# Patient Record
Sex: MALE | Race: WHITE | NOT HISPANIC OR LATINO | Employment: OTHER | URBAN - METROPOLITAN AREA
[De-identification: names, ages, dates, MRNs, and addresses within clinical notes are randomized per-mention and may not be internally consistent; named-entity substitution may affect disease eponyms.]

---

## 2018-08-08 PROBLEM — E11.9 DIABETES MELLITUS: Status: ACTIVE | Noted: 2018-08-08

## 2018-08-08 PROBLEM — R93.2 ABNORMAL LIVER CT: Status: ACTIVE | Noted: 2018-08-08

## 2018-08-08 PROBLEM — K92.2 GASTROINTESTINAL HEMORRHAGE: Status: ACTIVE | Noted: 2018-08-08

## 2018-08-09 PROBLEM — D62 ACUTE BLOOD LOSS ANEMIA: Status: ACTIVE | Noted: 2018-08-09

## 2018-08-09 PROBLEM — I85.01 BLEEDING ESOPHAGEAL VARICES: Status: ACTIVE | Noted: 2018-08-09

## 2018-08-09 PROBLEM — K92.2 GASTROINTESTINAL HEMORRHAGE: Status: ACTIVE | Noted: 2018-08-09

## 2018-08-09 PROBLEM — K74.60 CIRRHOSIS OF LIVER: Status: ACTIVE | Noted: 2018-08-08

## 2018-08-10 PROBLEM — R16.0 LIVER MASS: Status: ACTIVE | Noted: 2018-08-10

## 2018-08-14 ENCOUNTER — TELEPHONE (OUTPATIENT)
Dept: TRANSPLANT | Facility: CLINIC | Age: 73
End: 2018-08-14

## 2018-08-14 NOTE — TELEPHONE ENCOUNTER
----- Message from Merced Farmer sent at 8/14/2018 11:58 AM CDT -----  We have the pt recorders and they are now pending review by the referral nurse.  By:Merced Farmer

## 2018-08-23 ENCOUNTER — TELEPHONE (OUTPATIENT)
Dept: TRANSPLANT | Facility: CLINIC | Age: 73
End: 2018-08-23

## 2018-08-23 NOTE — TELEPHONE ENCOUNTER
Pt records reviewed.  Pt will be referred to Hepatology due to cirrhosis and liver mass suspicious for HCC  Initial referral received  from Dr. Pollo Reyes  Referral letter sent to provider and patient.      Call placed to pt home, cell and wife's cell, lvm re need for appt and offered appt for tomorrow .  Requested a call back.      Reached pt on work phone, he accepted the appt for tomorrow at  320 with Dr Salinas. Directions given to the clinic.

## 2018-08-24 ENCOUNTER — TELEPHONE (OUTPATIENT)
Dept: HEPATOLOGY | Facility: CLINIC | Age: 73
End: 2018-08-24

## 2018-08-24 ENCOUNTER — OFFICE VISIT (OUTPATIENT)
Dept: HEPATOLOGY | Facility: CLINIC | Age: 73
End: 2018-08-24
Payer: MEDICARE

## 2018-08-24 VITALS
DIASTOLIC BLOOD PRESSURE: 67 MMHG | TEMPERATURE: 96 F | SYSTOLIC BLOOD PRESSURE: 141 MMHG | WEIGHT: 236.75 LBS | HEART RATE: 67 BPM | OXYGEN SATURATION: 97 % | RESPIRATION RATE: 18 BRPM | BODY MASS INDEX: 38.05 KG/M2 | HEIGHT: 66 IN

## 2018-08-24 DIAGNOSIS — C83.30 DIFFUSE LARGE CELL LYMPHOMA IN REMISSION: ICD-10-CM

## 2018-08-24 DIAGNOSIS — K74.60 HEPATIC CIRRHOSIS, UNSPECIFIED HEPATIC CIRRHOSIS TYPE, UNSPECIFIED WHETHER ASCITES PRESENT: Primary | ICD-10-CM

## 2018-08-24 DIAGNOSIS — Z85.47 HISTORY OF TESTICULAR CANCER: ICD-10-CM

## 2018-08-24 DIAGNOSIS — R16.0 LIVER MASS: ICD-10-CM

## 2018-08-24 PROBLEM — I85.01 BLEEDING ESOPHAGEAL VARICES: Status: RESOLVED | Noted: 2018-08-09 | Resolved: 2018-08-24

## 2018-08-24 PROCEDURE — 99999 PR PBB SHADOW E&M-EST. PATIENT-LVL III: CPT | Mod: PBBFAC,,, | Performed by: INTERNAL MEDICINE

## 2018-08-24 PROCEDURE — 99205 OFFICE O/P NEW HI 60 MIN: CPT | Mod: S$PBB,,, | Performed by: INTERNAL MEDICINE

## 2018-08-24 PROCEDURE — 99213 OFFICE O/P EST LOW 20 MIN: CPT | Mod: PBBFAC | Performed by: INTERNAL MEDICINE

## 2018-08-24 NOTE — PROGRESS NOTES
Subjective:       Patient ID: Andrei Longo is a 73 y.o. male.    Chief Complaint: Mass    HPI  I saw this 73 y.o. man who came to the liver clinic with his wife and daughter.    Referred by Dr Wilkerson    - variceal bleed 8/8/2018- banded    CT abdo: 8/8/2018 + MRI  Cirrhosis + 3.9cm caudate lobe mass  - suspicious for HCC    AFP normal    Lost 25 lb since bleed  Intentional weight loss    PMH:  Cirrhosis  Testicular cancer- 25 years ago- right side  Diffuse large cell lymphoma- 2007- shoulder/hip  - underwent chemo and some partial therapy at [facility]    DM- 3 years    SH:  Owns inspection company  Never drank a lot of alcohol  Never smoked      Review of Systems   Constitutional: Negative for activity change, appetite change, chills, fatigue, fever and unexpected weight change.   HENT: Negative for hearing loss.    Eyes: Negative for discharge and visual disturbance.   Respiratory: Negative for cough, chest tightness, shortness of breath and wheezing.    Cardiovascular: Negative for chest pain, palpitations and leg swelling.   Gastrointestinal: Negative for abdominal distention, abdominal pain, constipation, diarrhea and nausea.   Genitourinary: Negative for dysuria and frequency.   Musculoskeletal: Negative for arthralgias and back pain.   Skin: Negative for pallor and rash.   Neurological: Negative for dizziness, tremors, speech difficulty and headaches.   Hematological: Negative for adenopathy.   Psychiatric/Behavioral: Negative for agitation and confusion.           Lab Results   Component Value Date     (H) 08/13/2018    AST 53 08/13/2018    ALKPHOS 164 (H) 08/13/2018    BILITOT 1.6 (H) 08/13/2018     Past Medical History:   Diagnosis Date    Bleeding esophageal varices 8/9/2018    Diabetes mellitus     Testicular cancer      Past Surgical History:   Procedure Laterality Date    testicular surgery       Current Outpatient Medications   Medication Sig    carvedilol (COREG) 3.125 MG tablet  Take 1 tablet (3.125 mg total) by mouth 2 (two) times daily.    insulin detemir U-100 (LEVEMIR FLEXTOUCH U-100 INSULN) 100 unit/mL (3 mL) SubQ InPn pen Inject 30 Units into the skin every evening.    pantoprazole (PROTONIX) 40 MG tablet Take 1 tablet (40 mg total) by mouth once daily.     No current facility-administered medications for this visit.        Objective:      Physical Exam   Constitutional: He is oriented to person, place, and time. He appears well-nourished.   HENT:   Head: Normocephalic.   Eyes: Pupils are equal, round, and reactive to light.   Neck: No thyromegaly present.   Cardiovascular: Normal rate, regular rhythm and normal heart sounds.   Pulmonary/Chest: Effort normal and breath sounds normal. He has no wheezes.   Abdominal: Soft. He exhibits no distension and no mass. There is no tenderness.   Lymphadenopathy:     He has no cervical adenopathy.   Neurological: He is alert and oriented to person, place, and time.   Skin: Skin is warm. No rash noted. No erythema.   Psychiatric: He has a normal mood and affect. His behavior is normal.       Assessment:       1. Hepatic cirrhosis, unspecified hepatic cirrhosis type, unspecified whether ascites present    2. Liver mass    3. History of testicular cancer    4. Diffuse large cell lymphoma in remission        Plan:   Explained the diagnosis of cirrhosis and portal hypertension.  Discussed the possibility of HCC and potential treatments which include locoregional therapy    - discuss imaging at IR conference next week  - needs EGD and banding- will contact Dr Wilkerson  - discussed the fact that he is not a LT candidate    - given info on cirrhosis  - reminded not to have raw raw oysters

## 2018-08-24 NOTE — TELEPHONE ENCOUNTER
Patient: Andrei Longo       MRN: [id number]      : 1945     Age: 73 y.o.  [address] [place] [address]    Provider: Hepatologist Jensen Hurtado    Urgency of review: urgent    Patient Transplant Status: Not a candidate    Reason for presentation: Initial staging for transplant    Clinical Summary: 73 year old man with cirrhosis (?SOTO) who presented with a variceal bleed on 2018.  CT and MRI showed a 3.9 cm caudate lobe mass  AFP normal    Imaging to be reviewed: CT and MRI from 2018    HCC Treatment History: none    ABO: A POS    Platelets:   Lab Results   Component Value Date/Time     (L) 2018 05:55 AM     Creatinine:   Lab Results   Component Value Date/Time    CREATININE 0.60 (L) 2018 05:55 AM     Bilirubin:   Lab Results   Component Value Date/Time    BILITOT 1.6 (H) 2018 05:55 AM     AFP Last 3 each if available:   Lab Results   Component Value Date/Time    AFP 3.3 2018 03:58 AM       MELD: MELD-Na score: 10 at 2018  9:42 PM  MELD score: 10 at 2018  9:42 PM  Calculated from:  Serum Creatinine: 0.6 mg/dL (Rounded to 1 mg/dL) at 2018  9:42 PM  Serum Sodium: 136 mmol/L at 2018  9:42 PM  Total Bilirubin: 1.5 mg/dL at 2018  9:42 PM  INR(ratio): 1.2 at 2018  2:18 AM  Age: 73 years    Plan: MRI 8/10/18 and CT 18 show a 3.9cm caudate lobe mass.  Appears to demonstrate hypoenhancement on all sequences without early arterial enhancement.  Given normal AFP and patients history of testicular lymphoma with contralateral relapse and axillary metastasis, would be more suspicious for a metastatic focus.    CTg biopsy to determine    Follow-up Provider: Adebayo Hurtado MD

## 2018-08-24 NOTE — LETTER
August 24, 2018      Pollo Wilkersno MD  [address]  [address]  [place] [address]           [facility] - Hepatology  [address]  [place] [address]  Phone: [phone]  Fax: [phone]          Patient: Andrei Longo   MR Number: [id number]   YOB: 1945   Date of Visit: 8/24/2018       Dear Dr. Pollo Wilkerson:    Thank you for referring Andrei Longo to me for evaluation. Attached you will find relevant portions of my assessment and plan of care.    If you have questions, please do not hesitate to call me. I look forward to following Andrei Longo along with you.    Sincerely,    Adebayo Hurtado MD    Enclosure  CC:  No Recipients    If you would like to receive this communication electronically, please contact [email or web address] or [phone] to request more information on [employer] access.    For providers and/or their staff who would like to refer a patient to [facility], please contact us through our one-stop-shop provider referral line, [facility], at [phone].    If you feel you have received this communication in error or would no longer like to receive these types of communications, please e-mail [email or web address]

## 2018-08-24 NOTE — TELEPHONE ENCOUNTER
Patient: Andrei Longo       MRN: [id number]      : 1945     Age: 73 y.o.  [address] [place] [address]    Provider: Hepatologist - [facility]    Urgency of review: urgent    Patient Transplant Status: Not a candidate    Reason for presentation: Initial staging for transplant    Clinical Summary: 73 year old man with cirrhosis (?SOTO) who presented with a variceal bleed on 2018.  CT and MRI showed a 3.9 cm caudate lobe mass  AFP normal    Imaging to be reviewed: CT and MRI from 2018    HCC Treatment History: none    ABO: A POS    Platelets:   Lab Results   Component Value Date/Time     (L) 2018 05:55 AM     Creatinine:   Lab Results   Component Value Date/Time    CREATININE 0.60 (L) 2018 05:55 AM     Bilirubin:   Lab Results   Component Value Date/Time    BILITOT 1.6 (H) 2018 05:55 AM     AFP Last 3 each if available:   Lab Results   Component Value Date/Time    AFP 3.3 2018 03:58 AM       MELD: MELD-Na score: 10 at 2018  9:42 PM  MELD score: 10 at 2018  9:42 PM  Calculated from:  Serum Creatinine: 0.6 mg/dL (Rounded to 1 mg/dL) at 2018  9:42 PM  Serum Sodium: 136 mmol/L at 2018  9:42 PM  Total Bilirubin: 1.5 mg/dL at 2018  9:42 PM  INR(ratio): 1.2 at 2018  2:18 AM  Age: 73 years    Plan:     Follow-up Provider:

## 2018-08-28 ENCOUNTER — CONFERENCE (OUTPATIENT)
Dept: TRANSPLANT | Facility: CLINIC | Age: 73
End: 2018-08-28

## 2018-08-29 ENCOUNTER — PATIENT MESSAGE (OUTPATIENT)
Dept: HEPATOLOGY | Facility: CLINIC | Age: 73
End: 2018-08-29

## 2018-08-29 DIAGNOSIS — R16.0 LIVER MASS: Primary | ICD-10-CM

## 2018-09-04 DIAGNOSIS — R16.0 LIVER MASS: Primary | ICD-10-CM

## 2018-09-06 PROBLEM — I85.00 ESOPHAGEAL VARICES: Status: ACTIVE | Noted: 2018-09-06

## 2018-09-12 ENCOUNTER — HOSPITAL ENCOUNTER (OUTPATIENT)
Facility: HOSPITAL | Age: 73
Discharge: HOME | End: 2018-09-12
Attending: INTERNAL MEDICINE | Admitting: INTERNAL MEDICINE
Payer: MEDICARE

## 2018-09-12 VITALS
WEIGHT: 233 LBS | HEART RATE: 57 BPM | DIASTOLIC BLOOD PRESSURE: 46 MMHG | TEMPERATURE: 98 F | OXYGEN SATURATION: 100 % | RESPIRATION RATE: 16 BRPM | SYSTOLIC BLOOD PRESSURE: 101 MMHG | BODY MASS INDEX: 36.57 KG/M2 | HEIGHT: 67 IN

## 2018-09-12 DIAGNOSIS — R16.0 LIVER MASS: ICD-10-CM

## 2018-09-12 LAB
INR PPP: 1
POCT GLUCOSE: 169 MG/DL (ref 70–110)
PROTHROMBIN TIME: 10.3 SEC

## 2018-09-12 PROCEDURE — 63600175 PHARM REV CODE 636 W HCPCS: Performed by: FAMILY MEDICINE

## 2018-09-12 PROCEDURE — 88342 IMHCHEM/IMCYTCHM 1ST ANTB: CPT | Mod: 26,,, | Performed by: PATHOLOGY

## 2018-09-12 PROCEDURE — 63600175 PHARM REV CODE 636 W HCPCS: Performed by: RADIOLOGY

## 2018-09-12 PROCEDURE — 88307 TISSUE EXAM BY PATHOLOGIST: CPT | Mod: 26,,, | Performed by: PATHOLOGY

## 2018-09-12 PROCEDURE — 25000003 PHARM REV CODE 250: Performed by: FAMILY MEDICINE

## 2018-09-12 PROCEDURE — 88333 PATH CONSLTJ SURG CYTO XM 1: CPT | Mod: 26,,, | Performed by: PATHOLOGY

## 2018-09-12 PROCEDURE — 88341 IMHCHEM/IMCYTCHM EA ADD ANTB: CPT | Mod: 59 | Performed by: PATHOLOGY

## 2018-09-12 PROCEDURE — 85610 PROTHROMBIN TIME: CPT

## 2018-09-12 PROCEDURE — 82962 GLUCOSE BLOOD TEST: CPT

## 2018-09-12 RX ORDER — SODIUM CHLORIDE 9 MG/ML
500 INJECTION, SOLUTION INTRAVENOUS ONCE
Status: COMPLETED | OUTPATIENT
Start: 2018-09-12 | End: 2018-09-12

## 2018-09-12 RX ORDER — MIDAZOLAM HYDROCHLORIDE 1 MG/ML
INJECTION INTRAMUSCULAR; INTRAVENOUS CODE/TRAUMA/SEDATION MEDICATION
Status: COMPLETED | OUTPATIENT
Start: 2018-09-12 | End: 2018-09-12

## 2018-09-12 RX ORDER — FENTANYL CITRATE 50 UG/ML
50 INJECTION, SOLUTION INTRAMUSCULAR; INTRAVENOUS
Status: DISCONTINUED | OUTPATIENT
Start: 2018-09-12 | End: 2018-09-12 | Stop reason: HOSPADM

## 2018-09-12 RX ORDER — MIDAZOLAM HYDROCHLORIDE 1 MG/ML
1 INJECTION INTRAMUSCULAR; INTRAVENOUS
Status: DISCONTINUED | OUTPATIENT
Start: 2018-09-12 | End: 2018-09-12 | Stop reason: HOSPADM

## 2018-09-12 RX ORDER — FENTANYL CITRATE 50 UG/ML
INJECTION, SOLUTION INTRAMUSCULAR; INTRAVENOUS CODE/TRAUMA/SEDATION MEDICATION
Status: COMPLETED | OUTPATIENT
Start: 2018-09-12 | End: 2018-09-12

## 2018-09-12 RX ADMIN — FENTANYL CITRATE 50 MCG: 50 INJECTION, SOLUTION INTRAMUSCULAR; INTRAVENOUS at 08:09

## 2018-09-12 RX ADMIN — MIDAZOLAM HYDROCHLORIDE 1 MG: 1 INJECTION, SOLUTION INTRAMUSCULAR; INTRAVENOUS at 08:09

## 2018-09-12 RX ADMIN — SODIUM CHLORIDE 500 ML: 0.9 INJECTION, SOLUTION INTRAVENOUS at 06:09

## 2018-09-12 RX ADMIN — MIDAZOLAM HYDROCHLORIDE 0.5 MG: 1 INJECTION, SOLUTION INTRAMUSCULAR; INTRAVENOUS at 08:09

## 2018-09-12 RX ADMIN — FENTANYL CITRATE 25 MCG: 50 INJECTION, SOLUTION INTRAMUSCULAR; INTRAVENOUS at 08:09

## 2018-09-12 NOTE — PROGRESS NOTES
Ok to discharge per Dr Root. Discharge instructions reviewed with patient and family. Understanding verbalized. Dressing CDI. VSS. IV discontinued with cannula intact, dressing applied.  Patient to garage via wheelchair by transport with family.

## 2018-09-12 NOTE — H&P
Radiology History & Physical      SUBJECTIVE:     Chief Complaint: Hepatic mass    History of Present Illness:  Andrei Longo is a 73 y.o. male who presents for image-guided biopsy of hepatic mass.    Past Medical History:   Diagnosis Date    Anemia     Bleeding esophageal varices 8/9/2018    Diabetes mellitus     Encounter for blood transfusion     Portal hypertensive gastropathy     Testicular cancer      Past Surgical History:   Procedure Laterality Date    EGD (ESOPHAGOGASTRODUODENOSCOPY) N/A 8/8/2018    Per[facility]O    ESOPHAGOGASTRODUODENOSCOPY N/A 8/8/2018    Procedure: EGD (ESOPHAGOGASTRODUODENOSCOPY);  Surgeon: Pollo Wilkerson MD;  Location: [facility];  Service: Endoscopy;  Laterality: N/A;    ESOPHAGOGASTRODUODENOSCOPY N/A 9/6/2018    Procedure: ESOPHAGOGASTRODUODENOSCOPY (EGD);  Surgeon: Pollo Wilkerson MD;  Location: [facility];  Service: Endoscopy;  Laterality: N/A;    ESOPHAGOGASTRODUODENOSCOPY (EGD) N/A 9/6/2018    Performed by [facility]erson MD at [facility]    TESTICLE SURGERY      testicular surgery         Home Meds:   Prior to Admission medications    Medication Sig Start Date End Date Taking? Authorizing Provider   carvedilol (COREG) 3.125 MG tablet Take 1 tablet (3.125 mg total) by mouth 2 (two) times daily. 8/13/18 8/13/19 Yes Carole Mcintosh NP   insulin detemir U-100 (LEVEMIR FLEXTOUCH U-100 INSULN) 100 unit/mL (3 mL) SubQ InPn pen Inject 30 Units into the skin every evening. 8/13/18 8/13/19 Yes Carole Mcintosh NP   pantoprazole (PROTONIX) 40 MG tablet Take 1 tablet (40 mg total) by mouth once daily. 8/13/18 8/13/19 Yes Carole Mcintosh NP     Anticoagulants/Antiplatelets: no anticoagulation    Allergies: Review of patient's allergies indicates:  No Known Allergies  Sedation History:  no adverse reactions    Review of Systems:   Hematological: no known coagulopathies  Respiratory: no shortness of breath  Cardiovascular:  no chest pain  Gastrointestinal: no abdominal pain  Genito-Urinary: no dysuria  Musculoskeletal: negative  Neurological: no TIA or stroke symptoms         OBJECTIVE:     Vital Signs (Most Recent)  Temp: 97.8 °F (36.6 °C) (09/12/18 0647)  Pulse: 65 (09/12/18 0647)  Resp: 18 (09/12/18 0647)  BP: (!) 111/53 (09/12/18 0647)  SpO2: 99 % (09/12/18 0647)    Physical Exam:  ASA: 2  Mallampati: 2    General: no acute distress  Mental Status: alert and oriented to person, place and time  HEENT: normocephalic, atraumatic  Chest: unlabored breathing  Abdomen: nondistended  Extremity: moves all extremities    Laboratory  Lab Results   Component Value Date    INR 1.2 (H) 08/08/2018       Lab Results   Component Value Date    WBC 5.80 08/13/2018    HGB 8.6 (L) 08/13/2018    HCT 25.4 (L) 08/13/2018    MCV 91 08/13/2018     (L) 08/13/2018      Lab Results   Component Value Date     (H) 08/13/2018     08/13/2018    K 3.8 08/13/2018     08/13/2018    CO2 26 08/13/2018    BUN 10 08/13/2018    CREATININE 0.60 (L) 08/13/2018    CALCIUM 8.1 (L) 08/13/2018     (H) 08/13/2018    AST 53 08/13/2018    ALBUMIN 3.0 (L) 08/13/2018    BILITOT 1.6 (H) 08/13/2018       ASSESSMENT/PLAN:     Sedation Plan: Up to moderate  Patient will undergo image-guided percutaneous biopsy of hepatic mass.    Eric Prescott M.D.  PGY-3 Radiology

## 2018-09-12 NOTE — PROGRESS NOTES
Pt arrived to ROCU bed 1 for 2 hour post CT liver biopsy recovery. Report received from SARTHAK Sparks. Pt denies pain/discomfort. Dressing CDI. VSS. No acute events. Family to bedside. See flow sheets for post procedure monitoring.

## 2018-09-14 ENCOUNTER — TELEPHONE (OUTPATIENT)
Dept: TRANSPLANT | Facility: CLINIC | Age: 73
End: 2018-09-14

## 2018-09-14 NOTE — TELEPHONE ENCOUNTER
IR Liver Pathology Conference Note    Patient:  Andrei Longo  MRN:   [id number]  YOB: 1945  Date of Transplant:  N/A  Native Diagnosis:     Discussion/Plan:    Presenter: Hepatologist - [place]    Reason for presenting: diagnosis confirmation    Concerns for Pathologists:   73 year old man with cirrhosis (?SOTO) who presented with a variceal bleed on 8/8/2018.  CT and MRI showed a 3.9 cm caudate lobe mass  AFP normal    MRI 8/10/18 and CT 8/8/18 show a 3.9cm caudate lobe mass.  Appears to demonstrate hypoenhancement on all sequences without early arterial enhancement.  Given normal AFP and patients history of testicular Cancer 25 years ago + diffuse large cell lymphoma in 2007 - would be more suspicious for a metastatic focus    Lab Results  WBC (K/uL)   Date Value   08/13/2018 5.80   08/12/2018 6.90   08/11/2018 11.40     PLT (K/uL)   Date Value   08/13/2018 105 (L)   08/12/2018 99 (L)   08/11/2018 112 (L)     INR (no units)   Date Value   09/12/2018 1.0   08/08/2018 1.2 (H)     AST (U/L)   Date Value   08/13/2018 53     ALT (U/L)   Date Value   08/13/2018 115 (H)   08/12/2018 145 (H)   08/11/2018 214 (H)     BILITOT (mg/dL)   Date Value   08/13/2018 1.6 (H)   08/12/2018 1.2   08/11/2018 1.6 (H)     ALKPHOS (U/L)   Date Value   08/13/2018 164 (H)   08/12/2018 137   08/11/2018 120     CREATININE (mg/dL)   Date Value   08/13/2018 0.60 (L)   08/12/2018 0.60 (L)   08/11/2018 0.60 (L)     AFP (ng/mL)   Date Value   08/09/2018 3.3

## 2018-09-25 ENCOUNTER — PATIENT MESSAGE (OUTPATIENT)
Dept: HEPATOLOGY | Facility: CLINIC | Age: 73
End: 2018-09-25

## 2018-09-27 ENCOUNTER — PATIENT MESSAGE (OUTPATIENT)
Dept: HEPATOLOGY | Facility: CLINIC | Age: 73
End: 2018-09-27

## 2018-09-28 ENCOUNTER — TELEPHONE (OUTPATIENT)
Dept: HEPATOLOGY | Facility: CLINIC | Age: 73
End: 2018-09-28

## 2018-09-28 DIAGNOSIS — K74.60 CIRRHOSIS OF LIVER WITHOUT ASCITES, UNSPECIFIED HEPATIC CIRRHOSIS TYPE: Primary | ICD-10-CM

## 2018-09-28 NOTE — TELEPHONE ENCOUNTER
Explained that his imaging was reviewed and did not look like a definitive HCC. His targeted liver biopsy showed cirrhosis/necrosis but no definite malignancy.    - agreed to repeat scan in 3 months  - same day MRI and visit with me

## 2018-10-01 NOTE — TELEPHONE ENCOUNTER
Pt contacted by phone and MRI, labs and clinic visit scheduled. Pt verbalized understanding and agreement.   Appt slips printed and mailed to pt.     SCC

## 2018-11-16 ENCOUNTER — HOSPITAL ENCOUNTER (OUTPATIENT)
Dept: RADIOLOGY | Facility: HOSPITAL | Age: 73
Discharge: HOME | End: 2018-11-16
Attending: INTERNAL MEDICINE
Payer: MEDICARE

## 2018-11-16 ENCOUNTER — OFFICE VISIT (OUTPATIENT)
Dept: HEPATOLOGY | Facility: CLINIC | Age: 73
End: 2018-11-16
Payer: MEDICARE

## 2018-11-16 VITALS
SYSTOLIC BLOOD PRESSURE: 128 MMHG | BODY MASS INDEX: 37.56 KG/M2 | OXYGEN SATURATION: 98 % | TEMPERATURE: 98 F | WEIGHT: 233.69 LBS | HEIGHT: 66 IN | HEART RATE: 63 BPM | DIASTOLIC BLOOD PRESSURE: 60 MMHG | RESPIRATION RATE: 18 BRPM

## 2018-11-16 DIAGNOSIS — K74.60 LIVER CIRRHOSIS SECONDARY TO NASH (NONALCOHOLIC STEATOHEPATITIS): Primary | ICD-10-CM

## 2018-11-16 DIAGNOSIS — K74.60 CIRRHOSIS OF LIVER WITHOUT ASCITES, UNSPECIFIED HEPATIC CIRRHOSIS TYPE: ICD-10-CM

## 2018-11-16 DIAGNOSIS — K74.60 HEPATIC CIRRHOSIS, UNSPECIFIED HEPATIC CIRRHOSIS TYPE, UNSPECIFIED WHETHER ASCITES PRESENT: ICD-10-CM

## 2018-11-16 DIAGNOSIS — K92.2 GASTROINTESTINAL HEMORRHAGE, UNSPECIFIED GASTROINTESTINAL HEMORRHAGE TYPE: ICD-10-CM

## 2018-11-16 DIAGNOSIS — R16.0 LIVER MASS: ICD-10-CM

## 2018-11-16 DIAGNOSIS — K75.81 LIVER CIRRHOSIS SECONDARY TO NASH (NONALCOHOLIC STEATOHEPATITIS): Primary | ICD-10-CM

## 2018-11-16 PROCEDURE — A9585 GADOBUTROL INJECTION: HCPCS | Performed by: INTERNAL MEDICINE

## 2018-11-16 PROCEDURE — 74183 MRI ABD W/O CNTR FLWD CNTR: CPT | Mod: TC

## 2018-11-16 PROCEDURE — 74183 MRI ABD W/O CNTR FLWD CNTR: CPT | Mod: 26,,, | Performed by: RADIOLOGY

## 2018-11-16 PROCEDURE — 99999 PR PBB SHADOW E&M-EST. PATIENT-LVL IV: CPT | Mod: PBBFAC,,, | Performed by: INTERNAL MEDICINE

## 2018-11-16 PROCEDURE — 99214 OFFICE O/P EST MOD 30 MIN: CPT | Mod: PBBFAC,25 | Performed by: INTERNAL MEDICINE

## 2018-11-16 PROCEDURE — 25500020 PHARM REV CODE 255: Performed by: INTERNAL MEDICINE

## 2018-11-16 PROCEDURE — 99214 OFFICE O/P EST MOD 30 MIN: CPT | Mod: S$PBB,,, | Performed by: INTERNAL MEDICINE

## 2018-11-16 RX ORDER — GADOBUTROL 604.72 MG/ML
10 INJECTION INTRAVENOUS
Status: COMPLETED | OUTPATIENT
Start: 2018-11-16 | End: 2018-11-16

## 2018-11-16 RX ADMIN — GADOBUTROL 10 ML: 604.72 INJECTION INTRAVENOUS at 02:11

## 2018-11-16 NOTE — PROGRESS NOTES
Subjective:       Patient ID: Andrei Longo is a 73 y.o. male.    Chief Complaint: Cirrhosis    HPI  I saw this 73 y.o. man who came to the liver clinic with his wife and daughter.    Referred by Dr Wilkerson    - variceal bleed 8/8/2018- banded  - at the time of his initial referral his imaging suggested an HCC.    CT abdo: 8/8/2018 + MRI  Cirrhosis + 3.9cm caudate lobe mass  - suspicious for HCC    AFP normal    Lost 25 lb since bleed  Intentional weight loss    Liver biopsy: 9/12/2018  The biopsy shows multiple fragments of cirrhotic liver. Also, are present few necrotic foci. There is no definitive  neoplasm/malignancy in this specimen; however, it cannot be completely excluded. In view of clinical presentation  of a mass, representative issues are a consideration. Please correlate clinically with repeat sampling as indicated.  Few immunostains (CAM52, synaptophysin, CD3 AND CD20) have been performed with appropriate controls;  however, no viable cells are identified in the necrotic focus.    MRI abdo: 11/16/2018  Persistent caudate lobe hypertrophy in the setting of cirrhosis with relative decreased prominence from prior.No suspicious solid enhancing lesion or washout.  Continued surveillance recommended.  Incidental intra-pancreatic tail splenules.  Enlarged spleen with suspected small inferior splenic infarct.  Trace perihepatic ascites.    PMH:  Cirrhosis  Testicular cancer- 25 years ago- right side  Diffuse large cell lymphoma- 2007- shoulder/hip  - underwent chemo and some partial therapy at [facility]    DM- 3 years    SH:  Owns inspection company  Never drank a lot of alcohol  Never smoked    Review of Systems   Constitutional: Negative for activity change, appetite change, chills, fatigue, fever and unexpected weight change.   HENT: Negative for hearing loss.    Eyes: Negative for discharge and visual disturbance.   Respiratory: Negative for cough, chest tightness, shortness of breath and wheezing.     Cardiovascular: Negative for chest pain, palpitations and leg swelling.   Gastrointestinal: Negative for abdominal distention, abdominal pain, constipation, diarrhea and nausea.   Genitourinary: Negative for dysuria and frequency.   Musculoskeletal: Negative for arthralgias and back pain.   Skin: Negative for pallor and rash.   Neurological: Negative for dizziness, tremors, speech difficulty and headaches.   Hematological: Negative for adenopathy.   Psychiatric/Behavioral: Negative for agitation and confusion.           Lab Results   Component Value Date    ALT 46 (H) 11/16/2018    AST 52 (H) 11/16/2018    ALKPHOS 136 (H) 11/16/2018    BILITOT 1.2 (H) 11/16/2018     Past Medical History:   Diagnosis Date    Anemia     Bleeding esophageal varices 8/9/2018    Diabetes mellitus     Encounter for blood transfusion     Portal hypertensive gastropathy     Testicular cancer      Past Surgical History:   Procedure Laterality Date    [id number] N/A 9/12/2018    Performed by [facility] at [facility] OR [address] [place] [address]    EGD (ESOPHAGOGASTRODUODENOSCOPY) N/A 8/8/2018    Per[facility] by [facility] at [facility]    ESOPHAGOGASTRODUODENOSCOPY N/A 8/8/2018    Procedure: EGD (ESOPHAGOGASTRODUODENOSCOPY);  Surgeon: Pollo Wilkerson MD;  Location: [facility];  Service: Endoscopy;  Laterality: N/A;    ESOPHAGOGASTRODUODENOSCOPY N/A 9/6/2018    Procedure: ESOPHAGOGASTRODUODENOSCOPY (EGD);  Surgeon: Pollo Wilkerson MD;  Location: [facility];  Service: Endoscopy;  Laterality: N/A;    ESOPHAGOGASTRODUODENOSCOPY (EGD) N/A 9/6/2018    Performed by [facility]erson MD at [facility]    TESTICLE SURGERY      testicular surgery       Current Outpatient Medications   Medication Sig    carvedilol (COREG) 3.125 MG tablet Take 1 tablet (3.125 mg total) by mouth 2 (two) times daily.    insulin detemir U-100 (LEVEMIR FLEXTOUCH U-100 INSULN) 100 unit/mL (3 mL) SubQ InPn pen Inject 30 Units into the skin every  evening. (Patient taking differently: Inject 5 Units into the skin every evening. )     No current facility-administered medications for this visit.        Objective:      Physical Exam   Constitutional: He is oriented to person, place, and time. He appears well-nourished.   HENT:   Head: Normocephalic.   Eyes: Pupils are equal, round, and reactive to light.   Neck: No thyromegaly present.   Cardiovascular: Normal rate, regular rhythm and normal heart sounds.   Pulmonary/Chest: Effort normal and breath sounds normal. He has no wheezes.   Abdominal: Soft. He exhibits no distension and no mass. There is no tenderness.   Lymphadenopathy:     He has no cervical adenopathy.   Neurological: He is alert and oriented to person, place, and time.   Skin: Skin is warm. No rash noted. No erythema.   Psychiatric: He has a normal mood and affect. His behavior is normal.       Assessment:       1. Liver cirrhosis secondary to SOTO (nonalcoholic steatohepatitis)    2. Hepatic cirrhosis, unspecified hepatic cirrhosis type, unspecified whether ascites present    3. Gastrointestinal hemorrhage, unspecified gastrointestinal hemorrhage type    4. Liver mass        Plan:   Explained the diagnosis of cirrhosis and portal hypertension.  Discussed the possibility of HCC and the fact that his recent biopsy and today's MRI, do not show that.    - will discuss new imaging at IR conference next week  - needs EGD and banding- will contact Dr Wilkerson  - stable cirrhosis    - 3 months

## 2018-11-19 ENCOUNTER — TELEPHONE (OUTPATIENT)
Dept: TRANSPLANT | Facility: CLINIC | Age: 73
End: 2018-11-19

## 2018-11-19 NOTE — TELEPHONE ENCOUNTER
Patient: Andrei Longo       MRN: [id number]      : 1945     Age: 73 y.o.  [address] [place] [address]    Provider: Hepatologist - [facility]    Urgency of review: non-urgent    Patient Transplant Status: Not a candidate    Reason for presentation: Indeterminate lesion    Clinical Summary: SOTO cirrhosis with ?3.5cm HCC on outside scan not confirmed on our scans.  Targeted liver biopsy showed cirrhosis  Hx of testicular Ca + lymphoma    Imaging to be reviewed: MRI 2018    HCC Treatment History: none    ABO: A POS    Platelets:   Lab Results   Component Value Date/Time     (L) 2018 11:51 AM     Creatinine:   Lab Results   Component Value Date/Time    CREATININE 0.7 2018 11:51 AM     Bilirubin:   Lab Results   Component Value Date/Time    BILITOT 1.2 (H) 2018 11:51 AM     AFP Last 3 each if available:   Lab Results   Component Value Date/Time    AFP 3.3 2018 11:51 AM    AFP 3.3 2018 03:58 AM       MELD: MELD-Na score: 7 at 2018 11:51 AM  MELD score: 7 at 2018 11:51 AM  Calculated from:  Serum Creatinine: 0.7 mg/dL (Rounded to 1 mg/dL) at 2018 11:51 AM  Serum Sodium: 138 mmol/L (Rounded to 137 mmol/L) at 2018 11:51 AM  Total Bilirubin: 1.2 mg/dL at 2018 11:51 AM  INR(ratio): 0.9 (Rounded to 1) at 2018 11:51 AM  Age: 73 years    Plan:     Follow-up Provider:

## 2018-11-21 NOTE — TELEPHONE ENCOUNTER
Patient: Andrei Longo       MRN: [id number]      : 1945     Age: 73 y.o.  [address] [place] [address]    Provider: Hepatologist Jensen Hurtado    Urgency of review: non-urgent    Patient Transplant Status: Not a candidate    Reason for presentation: Indeterminate lesion    Clinical Summary: SOTO cirrhosis with ?3.5cm HCC on outside scan not confirmed on our scans.  Targeted liver biopsy showed cirrhosis  Hx of testicular Ca + lymphoma    Imaging to be reviewed: MRI 2018    HCC Treatment History: none    ABO: A POS    Platelets:   Lab Results   Component Value Date/Time     (L) 2018 11:51 AM     Creatinine:   Lab Results   Component Value Date/Time    CREATININE 0.7 2018 11:51 AM     Bilirubin:   Lab Results   Component Value Date/Time    BILITOT 1.2 (H) 2018 11:51 AM     AFP Last 3 each if available:   Lab Results   Component Value Date/Time    AFP 3.3 2018 11:51 AM    AFP 3.3 2018 03:58 AM       MELD: MELD-Na score: 7 at 2018 11:51 AM  MELD score: 7 at 2018 11:51 AM  Calculated from:  Serum Creatinine: 0.7 mg/dL (Rounded to 1 mg/dL) at 2018 11:51 AM  Serum Sodium: 138 mmol/L (Rounded to 137 mmol/L) at 2018 11:51 AM  Total Bilirubin: 1.2 mg/dL at 2018 11:51 AM  INR(ratio): 0.9 (Rounded to 1) at 2018 11:51 AM  Age: 73 years    Plan:Decrease size of caudate lobe lesion when compared to multiple priors. Initial imaging showed heterogeneous lesion without features of HCC. Given improvement could this have represented an abscess. Biopsy was right on target. Rec follow up with repeat MRI in 3 months.    Large caudate lobe lesion with  heterogeneous enhancement pattern demonstrates interval decrease in size. Favor benign etiology, as it seems to be resolving on its own. Recommend repeating MRI in 3 months.         Follow-up Provider: Adebayo Hurtado MD

## 2018-11-27 ENCOUNTER — TELEPHONE (OUTPATIENT)
Dept: HEPATOLOGY | Facility: CLINIC | Age: 73
End: 2018-11-27

## 2018-11-27 ENCOUNTER — CONFERENCE (OUTPATIENT)
Dept: TRANSPLANT | Facility: CLINIC | Age: 73
End: 2018-11-27

## 2018-11-27 NOTE — TELEPHONE ENCOUNTER
Pt contacted by phone and IR conference recommendations reviewed. Labs and MRI will be scheduled in 3 months. Pt verbalized understanding and agreement.    SCC

## 2019-05-14 ENCOUNTER — TELEPHONE (OUTPATIENT)
Dept: HEPATOLOGY | Facility: CLINIC | Age: 74
End: 2019-05-14

## 2019-05-14 NOTE — TELEPHONE ENCOUNTER
Attempted to contact pt to schedule f/u labs, MRI and clinic visit with Dr. Hurtado. VM left. Awaiting call back.     SCC

## 2019-05-16 ENCOUNTER — TELEPHONE (OUTPATIENT)
Dept: HEPATOLOGY | Facility: CLINIC | Age: 74
End: 2019-05-16

## 2019-05-16 NOTE — TELEPHONE ENCOUNTER
----- Message from Erika Stinson sent at 5/16/2019  3:27 PM CDT -----  Contact: pt wife  Please call pt wife at [phone]    Patient is calling for future appt    Thank you

## 2019-06-12 ENCOUNTER — TELEPHONE (OUTPATIENT)
Dept: HEPATOLOGY | Facility: CLINIC | Age: 74
End: 2019-06-12

## 2019-06-12 NOTE — TELEPHONE ENCOUNTER
Patient of Dr. Hurtado'. Due for repeat MRI (per IR conference to watch past liver lesion) . Pt is coming for f/u appt already in July    Can you please contact pt, arrange for repeat MRI when he is here for f/u visit? MRI order already in per Dr. Hurtado

## 2019-06-12 NOTE — TELEPHONE ENCOUNTER
----- Message from Adebayo Hurtado MD sent at 6/12/2019  9:44 AM CDT -----  Regarding: RE: Chart review: due for MRI per IR recs 11/2018, want to do same day as f/u?  Yes please    ----- Message -----  From: Raissa Mtz NP  Sent: 6/11/2019   2:46 PM  To: Adebayo Hurtado MD  Subject: Chart review: due for MRI per IR recs 11/201#    Chart review:   IR recs 11/2018 recommend repeat MRI 2/2019, overdue  He is coming for f/u appt with you in July    Do you want to repeat MRI while he is here? Order already in

## 2019-07-05 ENCOUNTER — TELEPHONE (OUTPATIENT)
Dept: HEPATOLOGY | Facility: CLINIC | Age: 74
End: 2019-07-05

## 2019-07-05 NOTE — TELEPHONE ENCOUNTER
MA called patient back reschedule his MRI and follow up visit. Mailed new appt reminder to patient.

## 2019-07-05 NOTE — TELEPHONE ENCOUNTER
----- Message from Marcy Romero sent at 7/5/2019  8:37 AM CDT -----  Contact: patient   Needs Advice    Reason for call: Pt wishes to speak with nurse concerning rescheduling appointment         Communication Preference: [phone]    Additional Information: n/a

## 2019-07-12 ENCOUNTER — HOSPITAL ENCOUNTER (OUTPATIENT)
Dept: RADIOLOGY | Facility: HOSPITAL | Age: 74
Discharge: HOME | End: 2019-07-12
Attending: INTERNAL MEDICINE
Payer: MEDICARE

## 2019-07-12 PROCEDURE — 25500020 PHARM REV CODE 255: Performed by: INTERNAL MEDICINE

## 2019-07-12 PROCEDURE — A9585 GADOBUTROL INJECTION: HCPCS | Performed by: INTERNAL MEDICINE

## 2019-07-12 PROCEDURE — 74183 MRI ABDOMEN W WO CONTRAST: ICD-10-PCS | Mod: 26,,, | Performed by: RADIOLOGY

## 2019-07-12 PROCEDURE — 74183 MRI ABD W/O CNTR FLWD CNTR: CPT | Mod: 26,,, | Performed by: RADIOLOGY

## 2019-07-12 PROCEDURE — 74183 MRI ABD W/O CNTR FLWD CNTR: CPT | Mod: TC

## 2019-07-12 RX ORDER — GADOBUTROL 604.72 MG/ML
10 INJECTION INTRAVENOUS
Status: COMPLETED | OUTPATIENT
Start: 2019-07-12 | End: 2019-07-12

## 2019-07-12 RX ADMIN — GADOBUTROL 10 ML: 604.72 INJECTION INTRAVENOUS at 07:07

## 2019-07-15 ENCOUNTER — TELEPHONE (OUTPATIENT)
Dept: HEPATOLOGY | Facility: CLINIC | Age: 74
End: 2019-07-15

## 2019-07-15 NOTE — TELEPHONE ENCOUNTER
----- Message from Adebayo Hurtado MD sent at 7/12/2019  1:20 PM CDT -----  Please let him know that his MRI is stable- no concerns

## 2019-07-16 LAB
CREAT SERPL-MCNC: 0.5 MG/DL (ref 0.5–1.4)
SAMPLE: NORMAL

## 2019-08-02 ENCOUNTER — TELEPHONE (OUTPATIENT)
Dept: HEPATOLOGY | Facility: CLINIC | Age: 74
End: 2019-08-02

## 2019-08-02 ENCOUNTER — LAB VISIT (OUTPATIENT)
Dept: LAB | Facility: HOSPITAL | Age: 74
End: 2019-08-02
Attending: INTERNAL MEDICINE
Payer: MEDICARE

## 2019-08-02 ENCOUNTER — OFFICE VISIT (OUTPATIENT)
Dept: HEPATOLOGY | Facility: CLINIC | Age: 74
End: 2019-08-02
Payer: MEDICARE

## 2019-08-02 VITALS
WEIGHT: 227.06 LBS | HEART RATE: 61 BPM | OXYGEN SATURATION: 99 % | HEIGHT: 67 IN | DIASTOLIC BLOOD PRESSURE: 65 MMHG | SYSTOLIC BLOOD PRESSURE: 147 MMHG | RESPIRATION RATE: 18 BRPM | BODY MASS INDEX: 35.64 KG/M2

## 2019-08-02 DIAGNOSIS — K74.60 CIRRHOSIS OF LIVER WITHOUT ASCITES, UNSPECIFIED HEPATIC CIRRHOSIS TYPE: Primary | ICD-10-CM

## 2019-08-02 DIAGNOSIS — Z85.47 HISTORY OF TESTICULAR CANCER: ICD-10-CM

## 2019-08-02 DIAGNOSIS — K74.60 CIRRHOSIS OF LIVER WITHOUT ASCITES, UNSPECIFIED HEPATIC CIRRHOSIS TYPE: ICD-10-CM

## 2019-08-02 DIAGNOSIS — C83.30 DIFFUSE LARGE CELL LYMPHOMA IN REMISSION: ICD-10-CM

## 2019-08-02 LAB
AFP SERPL-MCNC: 3.8 NG/ML (ref 0–8.4)
ALBUMIN SERPL BCP-MCNC: 3.7 G/DL (ref 3.5–5.2)
ALP SERPL-CCNC: 124 U/L (ref 55–135)
ALT SERPL W/O P-5'-P-CCNC: 81 U/L (ref 10–44)
ANION GAP SERPL CALC-SCNC: 8 MMOL/L (ref 8–16)
AST SERPL-CCNC: 61 U/L (ref 10–40)
BASOPHILS # BLD AUTO: 0.03 K/UL (ref 0–0.2)
BASOPHILS NFR BLD: 0.4 % (ref 0–1.9)
BILIRUB SERPL-MCNC: 1.6 MG/DL (ref 0.1–1)
BUN SERPL-MCNC: 14 MG/DL (ref 8–23)
CALCIUM SERPL-MCNC: 9.8 MG/DL (ref 8.7–10.5)
CHLORIDE SERPL-SCNC: 105 MMOL/L (ref 95–110)
CO2 SERPL-SCNC: 26 MMOL/L (ref 23–29)
CREAT SERPL-MCNC: 0.8 MG/DL (ref 0.5–1.4)
DIFFERENTIAL METHOD: ABNORMAL
EOSINOPHIL # BLD AUTO: 0.1 K/UL (ref 0–0.5)
EOSINOPHIL NFR BLD: 1.1 % (ref 0–8)
ERYTHROCYTE [DISTWIDTH] IN BLOOD BY AUTOMATED COUNT: 13.9 % (ref 11.5–14.5)
EST. GFR  (AFRICAN AMERICAN): >60 ML/MIN/1.73 M^2
EST. GFR  (NON AFRICAN AMERICAN): >60 ML/MIN/1.73 M^2
GLUCOSE SERPL-MCNC: 74 MG/DL (ref 70–110)
HCT VFR BLD AUTO: 45.3 % (ref 40–54)
HGB BLD-MCNC: 14.9 G/DL (ref 14–18)
IMM GRANULOCYTES # BLD AUTO: 0.02 K/UL (ref 0–0.04)
IMM GRANULOCYTES NFR BLD AUTO: 0.3 % (ref 0–0.5)
INR PPP: 0.9 (ref 0.8–1.2)
LYMPHOCYTES # BLD AUTO: 1 K/UL (ref 1–4.8)
LYMPHOCYTES NFR BLD: 13.7 % (ref 18–48)
MCH RBC QN AUTO: 30.1 PG (ref 27–31)
MCHC RBC AUTO-ENTMCNC: 32.9 G/DL (ref 32–36)
MCV RBC AUTO: 92 FL (ref 82–98)
MONOCYTES # BLD AUTO: 0.8 K/UL (ref 0.3–1)
MONOCYTES NFR BLD: 10.5 % (ref 4–15)
NEUTROPHILS # BLD AUTO: 5.5 K/UL (ref 1.8–7.7)
NEUTROPHILS NFR BLD: 74 % (ref 38–73)
NRBC BLD-RTO: 0 /100 WBC
PLATELET # BLD AUTO: 101 K/UL (ref 150–350)
PMV BLD AUTO: 10.8 FL (ref 9.2–12.9)
POTASSIUM SERPL-SCNC: 4.1 MMOL/L (ref 3.5–5.1)
PROT SERPL-MCNC: 7.2 G/DL (ref 6–8.4)
PROTHROMBIN TIME: 9.9 SEC (ref 9–12.5)
RBC # BLD AUTO: 4.95 M/UL (ref 4.6–6.2)
SODIUM SERPL-SCNC: 139 MMOL/L (ref 136–145)
WBC # BLD AUTO: 7.36 K/UL (ref 3.9–12.7)

## 2019-08-02 PROCEDURE — 99999 PR PBB SHADOW E&M-EST. PATIENT-LVL III: CPT | Mod: PBBFAC,,, | Performed by: INTERNAL MEDICINE

## 2019-08-02 PROCEDURE — 99215 OFFICE O/P EST HI 40 MIN: CPT | Mod: S$PBB,,, | Performed by: INTERNAL MEDICINE

## 2019-08-02 PROCEDURE — 85025 COMPLETE CBC W/AUTO DIFF WBC: CPT

## 2019-08-02 PROCEDURE — 99213 OFFICE O/P EST LOW 20 MIN: CPT | Mod: PBBFAC | Performed by: INTERNAL MEDICINE

## 2019-08-02 PROCEDURE — 82105 ALPHA-FETOPROTEIN SERUM: CPT

## 2019-08-02 PROCEDURE — 99999 PR PBB SHADOW E&M-EST. PATIENT-LVL III: ICD-10-PCS | Mod: PBBFAC,,, | Performed by: INTERNAL MEDICINE

## 2019-08-02 PROCEDURE — 99215 PR OFFICE/OUTPT VISIT, EST, LEVL V, 40-54 MIN: ICD-10-PCS | Mod: S$PBB,,, | Performed by: INTERNAL MEDICINE

## 2019-08-02 PROCEDURE — 80053 COMPREHEN METABOLIC PANEL: CPT

## 2019-08-02 PROCEDURE — 85610 PROTHROMBIN TIME: CPT

## 2019-08-02 PROCEDURE — 36415 COLL VENOUS BLD VENIPUNCTURE: CPT

## 2019-08-02 RX ORDER — SITAGLIPTIN 100 MG/1
100 TABLET, FILM COATED ORAL DAILY
Refills: 5 | COMMUNITY
Start: 2019-07-22 | End: 2020-12-11

## 2019-08-02 NOTE — TELEPHONE ENCOUNTER
----- Message from Adebaoy Hurtado MD sent at 8/2/2019  2:05 PM CDT -----  Please let him know that him blood work is stable

## 2019-08-02 NOTE — PROGRESS NOTES
Subjective:       Patient ID: Andrei Longo is a 74 y.o. male.    Chief Complaint: Cirrhosis    HPI  I saw this 74 y.o. man who came to the liver clinic with his wife.    Referred by Dr Wilkerson    - variceal bleed 8/8/2018- banded  - at the time of his initial referral his imaging suggested an HCC.    EGD: 12/31/2018  - Grade II esophageal varices.                        - Portal hypertensive gastropathy.                        - Normal examined duodenum.                        - No specimens collected.    CT abdo: 8/8/2018 + MRI  Cirrhosis + 3.9cm caudate lobe mass  - suspicious for HCC    Weight stable    Liver biopsy: 9/12/2018  The biopsy shows multiple fragments of cirrhotic liver. Also, are present few necrotic foci. There is no definitive  neoplasm/malignancy in this specimen; however, it cannot be completely excluded. In view of clinical presentation  of a mass, representative issues are a consideration. Please correlate clinically with repeat sampling as indicated.  Few immunostains (CAM52, synaptophysin, CD3 AND CD20) have been performed with appropriate controls;  however, no viable cells are identified in the necrotic focus.    MRI abdo: 11/16/2018  Persistent caudate lobe hypertrophy in the setting of cirrhosis with relative decreased prominence from prior.No suspicious solid enhancing lesion or washout.  Continued surveillance recommended.  Incidental intra-pancreatic tail splenules.  Enlarged spleen with suspected small inferior splenic infarct.  Trace perihepatic ascites.    Last IR conference: 11/27/2019  Decrease size of caudate lobe lesion when compared to multiple priors. Initial imaging showed heterogeneous lesion without features of HCC. Given improvement could this have represented an abscess. Biopsy was right on target. Rec follow up with repeat MRI in 3 months.     MRI abdo: 7/12/2019  Findings consistent with cirrhosis with no suspicious enhancing lesions.  No evidence of  hepatocellular carcinoma.    Splenomegaly and esophageal varices consistent with portal venous hypertension.    Stable nonspecific soft tissue thickening surrounding the ERIC.  This is stable when compared to multiple prior exams    Keeping well  No further bleeding and has not developed decompensation.    PMH:  Cirrhosis  Testicular cancer- 25 years ago- right side  Diffuse large cell lymphoma- 2007- shoulder/hip  - underwent chemo and some partial therapy at [facility]    DM- 3 years    SH:  Owns [employer]  Never drank a lot of alcohol  Never smoked    Review of Systems   Constitutional: Negative for activity change, appetite change, chills, fatigue, fever and unexpected weight change.   HENT: Negative for hearing loss.    Eyes: Negative for discharge and visual disturbance.   Respiratory: Negative for cough, chest tightness, shortness of breath and wheezing.    Cardiovascular: Negative for chest pain, palpitations and leg swelling.   Gastrointestinal: Negative for abdominal distention, abdominal pain, constipation, diarrhea and nausea.   Genitourinary: Negative for dysuria and frequency.   Musculoskeletal: Negative for arthralgias and back pain.   Skin: Negative for pallor and rash.   Neurological: Negative for dizziness, tremors, speech difficulty and headaches.   Hematological: Negative for adenopathy.   Psychiatric/Behavioral: Negative for agitation and confusion.           Lab Results   Component Value Date    ALT 46 (H) 11/16/2018    AST 52 (H) 11/16/2018    ALKPHOS 136 (H) 11/16/2018    BILITOT 1.2 (H) 11/16/2018     Past Medical History:   Diagnosis Date    Anemia     Bleeding esophageal varices 8/9/2018    Diabetes mellitus     Encounter for blood transfusion     Portal hypertensive gastropathy     Portal hypertensive gastropathy     Testicular cancer      Past Surgical History:   Procedure Laterality Date    [id number]-[id number] N/A 9/12/2018    Performed by [facility] at [facility]  OR 2ND FLR    EGD (ESOPHAGOGASTRODUODENOSCOPY) N/A 8/8/2018    Performed by [facility]erson [facility] at [facility]    ESOPHAGOGASTRODUODENOSCOPY (EGD) N/A 12/31/2018    Performed by [facility]erson MD at [facility]    ESOPHAGOGASTRODUODENOSCOPY (EGD) N/A 9/6/2018    Performed by Poll[facility]erson MD at [facility]    ESOPHAGOGASTRODUODENOSCOPY W/ BANDING      TESTICLE SURGERY      testicular surgery       Current Outpatient Medications   Medication Sig    carvedilol (COREG) 3.125 MG tablet Take 1 tablet (3.125 mg total) by mouth 2 (two) times daily.    JANUVIA 100 mg Tab Take 100 mg by mouth once daily.     No current facility-administered medications for this visit.        Objective:      Physical Exam   Constitutional: He is oriented to person, place, and time. He appears well-nourished.   HENT:   Head: Normocephalic.   Eyes: Pupils are equal, round, and reactive to light.   Neck: No thyromegaly present.   Cardiovascular: Normal rate, regular rhythm and normal heart sounds.   Pulmonary/Chest: Effort normal and breath sounds normal. He has no wheezes.   Abdominal: Soft. He exhibits no distension and no mass. There is no tenderness.   Lymphadenopathy:     He has no cervical adenopathy.   Neurological: He is alert and oriented to person, place, and time.   Skin: Skin is warm. No rash noted. No erythema.   Psychiatric: He has a normal mood and affect. His behavior is normal.       Assessment:       1. Cirrhosis of liver without ascites, unspecified hepatic cirrhosis type    2. Diffuse large cell lymphoma in remission    3. History of testicular cancer        Plan:   Explained the diagnosis of cirrhosis and portal hypertension.  On carvedilol wih a HR of 61.    Will be scoped again by Dr Wilkerson in Dec 2019  Labs today    His imaging that was initially concerning for HCC, is now reassuring.    Clinic in 6 months and we can switch to US screening

## 2020-02-05 PROBLEM — M17.11 OSTEOARTHRITIS OF RIGHT KNEE: Status: ACTIVE | Noted: 2020-02-05

## 2021-01-20 ENCOUNTER — TELEPHONE (OUTPATIENT)
Dept: HEPATOLOGY | Facility: CLINIC | Age: 76
End: 2021-01-20

## 2021-01-27 ENCOUNTER — TELEPHONE (OUTPATIENT)
Dept: HEPATOLOGY | Facility: CLINIC | Age: 76
End: 2021-01-27

## 2021-02-01 ENCOUNTER — OFFICE VISIT (OUTPATIENT)
Dept: HEPATOLOGY | Facility: CLINIC | Age: 76
End: 2021-02-01
Payer: MEDICARE

## 2021-02-01 ENCOUNTER — LAB VISIT (OUTPATIENT)
Dept: LAB | Facility: HOSPITAL | Age: 76
End: 2021-02-01
Attending: INTERNAL MEDICINE
Payer: MEDICARE

## 2021-02-01 VITALS
BODY MASS INDEX: 37.96 KG/M2 | HEIGHT: 67 IN | HEART RATE: 59 BPM | OXYGEN SATURATION: 97 % | SYSTOLIC BLOOD PRESSURE: 147 MMHG | WEIGHT: 241.88 LBS | RESPIRATION RATE: 18 BRPM | DIASTOLIC BLOOD PRESSURE: 72 MMHG

## 2021-02-01 DIAGNOSIS — K74.69 OTHER CIRRHOSIS OF LIVER: Primary | ICD-10-CM

## 2021-02-01 DIAGNOSIS — C83.30 DIFFUSE LARGE CELL LYMPHOMA IN REMISSION: ICD-10-CM

## 2021-02-01 DIAGNOSIS — R31.0 GROSS HEMATURIA: ICD-10-CM

## 2021-02-01 DIAGNOSIS — Z85.47 HISTORY OF TESTICULAR CANCER: ICD-10-CM

## 2021-02-01 LAB
BILIRUB UR QL STRIP: NEGATIVE
CLARITY UR REFRACT.AUTO: CLEAR
COLOR UR AUTO: YELLOW
GLUCOSE UR QL STRIP: NEGATIVE
HGB UR QL STRIP: NEGATIVE
KETONES UR QL STRIP: NEGATIVE
LEUKOCYTE ESTERASE UR QL STRIP: NEGATIVE
NITRITE UR QL STRIP: NEGATIVE
PH UR STRIP: 5 [PH] (ref 5–8)
PROT UR QL STRIP: NEGATIVE
SP GR UR STRIP: 1.02 (ref 1–1.03)
URN SPEC COLLECT METH UR: NORMAL

## 2021-02-01 PROCEDURE — 99999 PR PBB SHADOW E&M-EST. PATIENT-LVL III: ICD-10-PCS | Mod: PBBFAC,,, | Performed by: INTERNAL MEDICINE

## 2021-02-01 PROCEDURE — 99215 PR OFFICE/OUTPT VISIT, EST, LEVL V, 40-54 MIN: ICD-10-PCS | Mod: S$PBB,,, | Performed by: INTERNAL MEDICINE

## 2021-02-01 PROCEDURE — 81003 URINALYSIS AUTO W/O SCOPE: CPT

## 2021-02-01 PROCEDURE — 99999 PR PBB SHADOW E&M-EST. PATIENT-LVL III: CPT | Mod: PBBFAC,,, | Performed by: INTERNAL MEDICINE

## 2021-02-01 PROCEDURE — 99213 OFFICE O/P EST LOW 20 MIN: CPT | Mod: PBBFAC | Performed by: INTERNAL MEDICINE

## 2021-02-01 PROCEDURE — 99215 OFFICE O/P EST HI 40 MIN: CPT | Mod: S$PBB,,, | Performed by: INTERNAL MEDICINE

## 2021-02-01 RX ORDER — PREGABALIN 100 MG/1
CAPSULE ORAL
COMMUNITY
Start: 2021-01-22 | End: 2022-01-01

## 2021-02-01 RX ORDER — HYDROCODONE BITARTRATE AND ACETAMINOPHEN 7.5; 325 MG/1; MG/1
TABLET ORAL
COMMUNITY
Start: 2021-01-22 | End: 2022-01-01

## 2021-02-01 RX ORDER — VALACYCLOVIR HYDROCHLORIDE 1 G/1
TABLET, FILM COATED ORAL
COMMUNITY
Start: 2021-01-22 | End: 2022-01-01

## 2021-02-03 ENCOUNTER — TELEPHONE (OUTPATIENT)
Dept: HEPATOLOGY | Facility: CLINIC | Age: 76
End: 2021-02-03

## 2021-02-05 ENCOUNTER — TELEPHONE (OUTPATIENT)
Dept: HEPATOLOGY | Facility: CLINIC | Age: 76
End: 2021-02-05

## 2022-01-01 ENCOUNTER — TELEPHONE (OUTPATIENT)
Dept: HEPATOLOGY | Facility: CLINIC | Age: 77
End: 2022-01-01
Payer: MEDICARE

## 2022-01-01 ENCOUNTER — TELEPHONE (OUTPATIENT)
Dept: HEPATOLOGY | Facility: CLINIC | Age: 77
End: 2022-01-01

## 2022-01-01 ENCOUNTER — HOSPITAL ENCOUNTER (INPATIENT)
Facility: HOSPITAL | Age: 77
LOS: 3 days | Discharge: HOSPICE | DRG: 432 | End: 2022-04-02
Attending: EMERGENCY MEDICINE | Admitting: HOSPITALIST
Payer: MEDICARE

## 2022-01-01 ENCOUNTER — OFFICE VISIT (OUTPATIENT)
Dept: HEPATOLOGY | Facility: CLINIC | Age: 77
DRG: 432 | End: 2022-01-01
Payer: MEDICARE

## 2022-01-01 VITALS
DIASTOLIC BLOOD PRESSURE: 58 MMHG | TEMPERATURE: 97 F | HEIGHT: 67 IN | HEART RATE: 76 BPM | RESPIRATION RATE: 18 BRPM | SYSTOLIC BLOOD PRESSURE: 114 MMHG | OXYGEN SATURATION: 96 % | WEIGHT: 237.44 LBS | BODY MASS INDEX: 37.27 KG/M2

## 2022-01-01 VITALS
TEMPERATURE: 98 F | RESPIRATION RATE: 18 BRPM | OXYGEN SATURATION: 94 % | SYSTOLIC BLOOD PRESSURE: 101 MMHG | DIASTOLIC BLOOD PRESSURE: 53 MMHG | HEART RATE: 76 BPM | HEIGHT: 67 IN | WEIGHT: 237.63 LBS | BODY MASS INDEX: 37.3 KG/M2

## 2022-01-01 DIAGNOSIS — K72.00 ACUTE LIVER FAILURE WITHOUT HEPATIC COMA: ICD-10-CM

## 2022-01-01 DIAGNOSIS — K74.60 CIRRHOSIS OF LIVER WITH ASCITES, UNSPECIFIED HEPATIC CIRRHOSIS TYPE: ICD-10-CM

## 2022-01-01 DIAGNOSIS — R07.9 CHEST PAIN: ICD-10-CM

## 2022-01-01 DIAGNOSIS — K74.69 OTHER CIRRHOSIS OF LIVER: ICD-10-CM

## 2022-01-01 DIAGNOSIS — K74.69 OTHER CIRRHOSIS OF LIVER: Primary | ICD-10-CM

## 2022-01-01 DIAGNOSIS — Z85.47 HISTORY OF TESTICULAR CANCER: Primary | ICD-10-CM

## 2022-01-01 DIAGNOSIS — R60.1 ANASARCA: Primary | ICD-10-CM

## 2022-01-01 DIAGNOSIS — I50.9 CHF (CONGESTIVE HEART FAILURE): ICD-10-CM

## 2022-01-01 DIAGNOSIS — R18.8 CIRRHOSIS OF LIVER WITH ASCITES, UNSPECIFIED HEPATIC CIRRHOSIS TYPE: ICD-10-CM

## 2022-01-01 LAB
ALBUMIN FLD-MCNC: 0.5 G/DL
ALBUMIN SERPL BCP-MCNC: 2 G/DL (ref 3.5–5.2)
ALBUMIN SERPL BCP-MCNC: 2 G/DL (ref 3.5–5.2)
ALBUMIN SERPL BCP-MCNC: 2.1 G/DL (ref 3.5–5.2)
ALBUMIN SERPL BCP-MCNC: 2.2 G/DL (ref 3.5–5.2)
ALP SERPL-CCNC: 140 U/L (ref 55–135)
ALP SERPL-CCNC: 148 U/L (ref 55–135)
ALP SERPL-CCNC: 153 U/L (ref 55–135)
ALP SERPL-CCNC: 158 U/L (ref 55–135)
ALT SERPL W/O P-5'-P-CCNC: 21 U/L (ref 10–44)
ALT SERPL W/O P-5'-P-CCNC: 22 U/L (ref 10–44)
ALT SERPL W/O P-5'-P-CCNC: 22 U/L (ref 10–44)
ALT SERPL W/O P-5'-P-CCNC: 23 U/L (ref 10–44)
AMMONIA PLAS-SCNC: 48 UMOL/L (ref 10–50)
ANION GAP SERPL CALC-SCNC: 14 MMOL/L (ref 8–16)
ANION GAP SERPL CALC-SCNC: 15 MMOL/L (ref 8–16)
ANION GAP SERPL CALC-SCNC: 17 MMOL/L (ref 8–16)
ANION GAP SERPL CALC-SCNC: 18 MMOL/L (ref 8–16)
ANISOCYTOSIS BLD QL SMEAR: SLIGHT
ANISOCYTOSIS BLD QL SMEAR: SLIGHT
APPEARANCE FLD: CLEAR
ASCENDING AORTA: 3.33 CM
AST SERPL-CCNC: 62 U/L (ref 10–40)
AST SERPL-CCNC: 71 U/L (ref 10–40)
AST SERPL-CCNC: 75 U/L (ref 10–40)
AST SERPL-CCNC: 79 U/L (ref 10–40)
AV INDEX (PROSTH): 0.68
AV MEAN GRADIENT: 13 MMHG
AV PEAK GRADIENT: 24 MMHG
AV VALVE AREA: 2.59 CM2
AV VELOCITY RATIO: 0.65
BACTERIA SPEC AEROBE CULT: NO GROWTH
BASOPHILS # BLD AUTO: 0.03 K/UL (ref 0–0.2)
BASOPHILS # BLD AUTO: 0.03 K/UL (ref 0–0.2)
BASOPHILS # BLD AUTO: 0.04 K/UL (ref 0–0.2)
BASOPHILS # BLD AUTO: 0.05 K/UL (ref 0–0.2)
BASOPHILS NFR BLD: 0.3 % (ref 0–1.9)
BASOPHILS NFR BLD: 0.4 % (ref 0–1.9)
BASOPHILS NFR BLD: 0.4 % (ref 0–1.9)
BASOPHILS NFR BLD: 0.5 % (ref 0–1.9)
BILIRUB SERPL-MCNC: 7.9 MG/DL (ref 0.1–1)
BILIRUB SERPL-MCNC: 8.8 MG/DL (ref 0.1–1)
BILIRUB SERPL-MCNC: 8.9 MG/DL (ref 0.1–1)
BILIRUB SERPL-MCNC: 8.9 MG/DL (ref 0.1–1)
BILIRUB UR QL STRIP: ABNORMAL
BNP SERPL-MCNC: 108 PG/ML (ref 0–99)
BODY FLD TYPE: NORMAL
BODY FLUID SOURCE, LDH: NORMAL
BSA FOR ECHO PROCEDURE: 2.25 M2
BUN SERPL-MCNC: 29 MG/DL (ref 8–23)
BUN SERPL-MCNC: 32 MG/DL (ref 8–23)
BUN SERPL-MCNC: 43 MG/DL (ref 8–23)
BUN SERPL-MCNC: 55 MG/DL (ref 8–23)
BURR CELLS BLD QL SMEAR: ABNORMAL
CALCIUM SERPL-MCNC: 8.6 MG/DL (ref 8.7–10.5)
CALCIUM SERPL-MCNC: 8.7 MG/DL (ref 8.7–10.5)
CALCIUM SERPL-MCNC: 8.9 MG/DL (ref 8.7–10.5)
CALCIUM SERPL-MCNC: 9.3 MG/DL (ref 8.7–10.5)
CHLORIDE SERPL-SCNC: 94 MMOL/L (ref 95–110)
CHLORIDE SERPL-SCNC: 94 MMOL/L (ref 95–110)
CHLORIDE SERPL-SCNC: 95 MMOL/L (ref 95–110)
CHLORIDE SERPL-SCNC: 95 MMOL/L (ref 95–110)
CLARITY UR REFRACT.AUTO: ABNORMAL
CO2 SERPL-SCNC: 19 MMOL/L (ref 23–29)
CO2 SERPL-SCNC: 19 MMOL/L (ref 23–29)
CO2 SERPL-SCNC: 21 MMOL/L (ref 23–29)
CO2 SERPL-SCNC: 22 MMOL/L (ref 23–29)
COLOR FLD: YELLOW
COLOR UR AUTO: ABNORMAL
CREAT SERPL-MCNC: 0.7 MG/DL (ref 0.5–1.4)
CREAT SERPL-MCNC: 0.8 MG/DL (ref 0.5–1.4)
CREAT SERPL-MCNC: 0.8 MG/DL (ref 0.5–1.4)
CREAT SERPL-MCNC: 1.1 MG/DL (ref 0.5–1.4)
CTP QC/QA: YES
CV ECHO LV RWT: 0.38 CM
DACRYOCYTES BLD QL SMEAR: ABNORMAL
DIFFERENTIAL METHOD: ABNORMAL
DOP CALC AO PEAK VEL: 2.46 M/S
DOP CALC AO VTI: 52.57 CM
DOP CALC LVOT AREA: 3.8 CM2
DOP CALC LVOT DIAMETER: 2.2 CM
DOP CALC LVOT PEAK VEL: 1.6 M/S
DOP CALC LVOT STROKE VOLUME: 136.28 CM3
DOP CALCLVOT PEAK VEL VTI: 35.87 CM
E WAVE DECELERATION TIME: 252.52 MSEC
E/A RATIO: 0.69
E/E' RATIO: 12.71 M/S
ECHO LV POSTERIOR WALL: 0.78 CM (ref 0.6–1.1)
EJECTION FRACTION: 70 %
EOSINOPHIL # BLD AUTO: 0.1 K/UL (ref 0–0.5)
EOSINOPHIL NFR BLD: 0.5 % (ref 0–8)
EOSINOPHIL NFR BLD: 0.6 % (ref 0–8)
EOSINOPHIL NFR BLD: 0.8 % (ref 0–8)
EOSINOPHIL NFR BLD: 0.8 % (ref 0–8)
ERYTHROCYTE [DISTWIDTH] IN BLOOD BY AUTOMATED COUNT: 20.6 % (ref 11.5–14.5)
ERYTHROCYTE [DISTWIDTH] IN BLOOD BY AUTOMATED COUNT: 20.6 % (ref 11.5–14.5)
ERYTHROCYTE [DISTWIDTH] IN BLOOD BY AUTOMATED COUNT: 21.2 % (ref 11.5–14.5)
ERYTHROCYTE [DISTWIDTH] IN BLOOD BY AUTOMATED COUNT: 21.6 % (ref 11.5–14.5)
EST. GFR  (AFRICAN AMERICAN): >60 ML/MIN/1.73 M^2
EST. GFR  (NON AFRICAN AMERICAN): >60 ML/MIN/1.73 M^2
FRACTIONAL SHORTENING: 42 % (ref 28–44)
GLUCOSE SERPL-MCNC: 100 MG/DL (ref 70–110)
GLUCOSE SERPL-MCNC: 107 MG/DL (ref 70–110)
GLUCOSE SERPL-MCNC: 112 MG/DL (ref 70–110)
GLUCOSE SERPL-MCNC: 95 MG/DL (ref 70–110)
GLUCOSE UR QL STRIP: NEGATIVE
GRAM STN SPEC: NORMAL
GRAM STN SPEC: NORMAL
HCT VFR BLD AUTO: 29.8 % (ref 40–54)
HCT VFR BLD AUTO: 32.1 % (ref 40–54)
HCT VFR BLD AUTO: 32.2 % (ref 40–54)
HCT VFR BLD AUTO: 34.3 % (ref 40–54)
HCV AB SERPL QL IA: NEGATIVE
HGB BLD-MCNC: 10.4 G/DL (ref 14–18)
HGB BLD-MCNC: 10.5 G/DL (ref 14–18)
HGB BLD-MCNC: 10.9 G/DL (ref 14–18)
HGB BLD-MCNC: 9.6 G/DL (ref 14–18)
HGB UR QL STRIP: NEGATIVE
HYALINE CASTS UR QL AUTO: 14 /LPF
HYPOCHROMIA BLD QL SMEAR: ABNORMAL
HYPOCHROMIA BLD QL SMEAR: ABNORMAL
IMM GRANULOCYTES # BLD AUTO: 0.06 K/UL (ref 0–0.04)
IMM GRANULOCYTES # BLD AUTO: 0.11 K/UL (ref 0–0.04)
IMM GRANULOCYTES # BLD AUTO: 0.16 K/UL (ref 0–0.04)
IMM GRANULOCYTES # BLD AUTO: 0.18 K/UL (ref 0–0.04)
IMM GRANULOCYTES NFR BLD AUTO: 0.8 % (ref 0–0.5)
IMM GRANULOCYTES NFR BLD AUTO: 1.2 % (ref 0–0.5)
IMM GRANULOCYTES NFR BLD AUTO: 1.7 % (ref 0–0.5)
IMM GRANULOCYTES NFR BLD AUTO: 1.8 % (ref 0–0.5)
INR PPP: 1.3 (ref 0.8–1.2)
INR PPP: 1.3 (ref 0.8–1.2)
INR PPP: 1.4 (ref 0.8–1.2)
INR PPP: 1.4 (ref 0.8–1.2)
INTERVENTRICULAR SEPTUM: 0.69 CM (ref 0.6–1.1)
KETONES UR QL STRIP: NEGATIVE
LA MAJOR: 5.12 CM
LA MINOR: 5.53 CM
LA WIDTH: 4.59 CM
LDH FLD L TO P-CCNC: 153 U/L
LEFT ATRIUM SIZE: 3.71 CM
LEFT ATRIUM VOLUME INDEX MOD: 31.9 ML/M2
LEFT ATRIUM VOLUME INDEX: 35.5 ML/M2
LEFT ATRIUM VOLUME MOD: 69.12 CM3
LEFT ATRIUM VOLUME: 76.96 CM3
LEFT INTERNAL DIMENSION IN SYSTOLE: 2.36 CM (ref 2.1–4)
LEFT VENTRICLE DIASTOLIC VOLUME INDEX: 34.26 ML/M2
LEFT VENTRICLE DIASTOLIC VOLUME: 74.35 ML
LEFT VENTRICLE MASS INDEX: 40 G/M2
LEFT VENTRICLE SYSTOLIC VOLUME INDEX: 8.9 ML/M2
LEFT VENTRICLE SYSTOLIC VOLUME: 19.33 ML
LEFT VENTRICULAR INTERNAL DIMENSION IN DIASTOLE: 4.1 CM (ref 3.5–6)
LEFT VENTRICULAR MASS: 87.03 G
LEUKOCYTE ESTERASE UR QL STRIP: NEGATIVE
LV LATERAL E/E' RATIO: 11.13 M/S
LV SEPTAL E/E' RATIO: 14.83 M/S
LYMPHOCYTES # BLD AUTO: 0.7 K/UL (ref 1–4.8)
LYMPHOCYTES # BLD AUTO: 0.9 K/UL (ref 1–4.8)
LYMPHOCYTES NFR BLD: 10.3 % (ref 18–48)
LYMPHOCYTES NFR BLD: 8.6 % (ref 18–48)
LYMPHOCYTES NFR BLD: 9.7 % (ref 18–48)
LYMPHOCYTES NFR BLD: 9.8 % (ref 18–48)
LYMPHOCYTES NFR FLD MANUAL: 96 %
MAGNESIUM SERPL-MCNC: 1.8 MG/DL (ref 1.6–2.6)
MAGNESIUM SERPL-MCNC: 1.9 MG/DL (ref 1.6–2.6)
MAGNESIUM SERPL-MCNC: 2.2 MG/DL (ref 1.6–2.6)
MCH RBC QN AUTO: 31.2 PG (ref 27–31)
MCH RBC QN AUTO: 31.5 PG (ref 27–31)
MCH RBC QN AUTO: 31.6 PG (ref 27–31)
MCH RBC QN AUTO: 32.5 PG (ref 27–31)
MCHC RBC AUTO-ENTMCNC: 31.8 G/DL (ref 32–36)
MCHC RBC AUTO-ENTMCNC: 32.2 G/DL (ref 32–36)
MCHC RBC AUTO-ENTMCNC: 32.3 G/DL (ref 32–36)
MCHC RBC AUTO-ENTMCNC: 32.7 G/DL (ref 32–36)
MCV RBC AUTO: 101 FL (ref 82–98)
MCV RBC AUTO: 97 FL (ref 82–98)
MCV RBC AUTO: 97 FL (ref 82–98)
MCV RBC AUTO: 99 FL (ref 82–98)
MESOTHL CELL NFR FLD MANUAL: 4 %
MICROSCOPIC COMMENT: ABNORMAL
MONOCYTES # BLD AUTO: 1.6 K/UL (ref 0.3–1)
MONOCYTES # BLD AUTO: 1.6 K/UL (ref 0.3–1)
MONOCYTES # BLD AUTO: 1.8 K/UL (ref 0.3–1)
MONOCYTES # BLD AUTO: 2.1 K/UL (ref 0.3–1)
MONOCYTES NFR BLD: 17.6 % (ref 4–15)
MONOCYTES NFR BLD: 20.4 % (ref 4–15)
MONOCYTES NFR BLD: 21 % (ref 4–15)
MONOCYTES NFR BLD: 22 % (ref 4–15)
MV PEAK A VEL: 1.29 M/S
MV PEAK E VEL: 0.89 M/S
MV STENOSIS PRESSURE HALF TIME: 73.23 MS
MV VALVE AREA P 1/2 METHOD: 3 CM2
NEUTROPHILS # BLD AUTO: 4.8 K/UL (ref 1.8–7.7)
NEUTROPHILS # BLD AUTO: 6.1 K/UL (ref 1.8–7.7)
NEUTROPHILS # BLD AUTO: 6.5 K/UL (ref 1.8–7.7)
NEUTROPHILS # BLD AUTO: 6.7 K/UL (ref 1.8–7.7)
NEUTROPHILS NFR BLD: 66.3 % (ref 38–73)
NEUTROPHILS NFR BLD: 67.2 % (ref 38–73)
NEUTROPHILS NFR BLD: 67.7 % (ref 38–73)
NEUTROPHILS NFR BLD: 69.6 % (ref 38–73)
NITRITE UR QL STRIP: NEGATIVE
NRBC BLD-RTO: 0 /100 WBC
OVALOCYTES BLD QL SMEAR: ABNORMAL
OVALOCYTES BLD QL SMEAR: ABNORMAL
PH UR STRIP: 5 [PH] (ref 5–8)
PHOSPHATE SERPL-MCNC: 5.1 MG/DL (ref 2.7–4.5)
PHOSPHATE SERPL-MCNC: 5.2 MG/DL (ref 2.7–4.5)
PHOSPHATE SERPL-MCNC: 5.2 MG/DL (ref 2.7–4.5)
PISA TR MAX VEL: 2.74 M/S
PLATELET # BLD AUTO: 54 K/UL (ref 150–450)
PLATELET # BLD AUTO: 60 K/UL (ref 150–450)
PLATELET # BLD AUTO: 62 K/UL (ref 150–450)
PLATELET # BLD AUTO: 65 K/UL (ref 150–450)
PLATELET BLD QL SMEAR: ABNORMAL
PLATELET BLD QL SMEAR: ABNORMAL
PMV BLD AUTO: 11.3 FL (ref 9.2–12.9)
PMV BLD AUTO: 11.9 FL (ref 9.2–12.9)
PMV BLD AUTO: 12.2 FL (ref 9.2–12.9)
PMV BLD AUTO: 12.6 FL (ref 9.2–12.9)
POCT GLUCOSE: 101 MG/DL (ref 70–110)
POCT GLUCOSE: 102 MG/DL (ref 70–110)
POCT GLUCOSE: 109 MG/DL (ref 70–110)
POCT GLUCOSE: 111 MG/DL (ref 70–110)
POCT GLUCOSE: 118 MG/DL (ref 70–110)
POCT GLUCOSE: 125 MG/DL (ref 70–110)
POCT GLUCOSE: 138 MG/DL (ref 70–110)
POCT GLUCOSE: 152 MG/DL (ref 70–110)
POCT GLUCOSE: 154 MG/DL (ref 70–110)
POCT GLUCOSE: 182 MG/DL (ref 70–110)
POCT GLUCOSE: 97 MG/DL (ref 70–110)
POIKILOCYTOSIS BLD QL SMEAR: SLIGHT
POIKILOCYTOSIS BLD QL SMEAR: SLIGHT
POLYCHROMASIA BLD QL SMEAR: ABNORMAL
POTASSIUM SERPL-SCNC: 4.7 MMOL/L (ref 3.5–5.1)
POTASSIUM SERPL-SCNC: 4.7 MMOL/L (ref 3.5–5.1)
POTASSIUM SERPL-SCNC: 4.8 MMOL/L (ref 3.5–5.1)
POTASSIUM SERPL-SCNC: 5.4 MMOL/L (ref 3.5–5.1)
PROT FLD-MCNC: <1 G/DL
PROT SERPL-MCNC: 4.9 G/DL (ref 6–8.4)
PROT SERPL-MCNC: 5.2 G/DL (ref 6–8.4)
PROT SERPL-MCNC: 5.2 G/DL (ref 6–8.4)
PROT SERPL-MCNC: 5.7 G/DL (ref 6–8.4)
PROT UR QL STRIP: NEGATIVE
PROTHROMBIN TIME: 13 SEC (ref 9–12.5)
PROTHROMBIN TIME: 13.6 SEC (ref 9–12.5)
PROTHROMBIN TIME: 14.1 SEC (ref 9–12.5)
PROTHROMBIN TIME: 14.5 SEC (ref 9–12.5)
RA MAJOR: 3.9 CM
RA PRESSURE: 3 MMHG
RA WIDTH: 2.78 CM
RBC # BLD AUTO: 3.08 M/UL (ref 4.6–6.2)
RBC # BLD AUTO: 3.2 M/UL (ref 4.6–6.2)
RBC # BLD AUTO: 3.32 M/UL (ref 4.6–6.2)
RBC # BLD AUTO: 3.46 M/UL (ref 4.6–6.2)
RBC #/AREA URNS AUTO: 2 /HPF (ref 0–4)
RIGHT VENTRICULAR END-DIASTOLIC DIMENSION: 3.93 CM
RV TISSUE DOPPLER FREE WALL SYSTOLIC VELOCITY 1 (APICAL 4 CHAMBER VIEW): 19.46 CM/S
SARS-COV-2 RDRP RESP QL NAA+PROBE: NEGATIVE
SINUS: 3.2 CM
SODIUM SERPL-SCNC: 129 MMOL/L (ref 136–145)
SODIUM SERPL-SCNC: 131 MMOL/L (ref 136–145)
SODIUM SERPL-SCNC: 131 MMOL/L (ref 136–145)
SODIUM SERPL-SCNC: 132 MMOL/L (ref 136–145)
SP GR UR STRIP: 1.02 (ref 1–1.03)
SPECIMEN SOURCE: NORMAL
SPECIMEN SOURCE: NORMAL
SQUAMOUS #/AREA URNS AUTO: 2 /HPF
STJ: 2.96 CM
TDI LATERAL: 0.08 M/S
TDI SEPTAL: 0.06 M/S
TDI: 0.07 M/S
TR MAX PG: 30 MMHG
TRICUSPID ANNULAR PLANE SYSTOLIC EXCURSION: 2.46 CM
TV REST PULMONARY ARTERY PRESSURE: 33 MMHG
URN SPEC COLLECT METH UR: ABNORMAL
WBC # BLD AUTO: 7.23 K/UL (ref 3.9–12.7)
WBC # BLD AUTO: 9.02 K/UL (ref 3.9–12.7)
WBC # BLD AUTO: 9.28 K/UL (ref 3.9–12.7)
WBC # BLD AUTO: 9.95 K/UL (ref 3.9–12.7)
WBC # FLD: 4 /CU MM
WBC #/AREA URNS AUTO: 1 /HPF (ref 0–5)

## 2022-01-01 PROCEDURE — 81001 URINALYSIS AUTO W/SCOPE: CPT | Performed by: PHYSICIAN ASSISTANT

## 2022-01-01 PROCEDURE — 99215 OFFICE O/P EST HI 40 MIN: CPT | Mod: S$PBB,,, | Performed by: INTERNAL MEDICINE

## 2022-01-01 PROCEDURE — 85025 COMPLETE CBC W/AUTO DIFF WBC: CPT | Performed by: PHYSICIAN ASSISTANT

## 2022-01-01 PROCEDURE — 85610 PROTHROMBIN TIME: CPT | Performed by: FAMILY MEDICINE

## 2022-01-01 PROCEDURE — 99213 OFFICE O/P EST LOW 20 MIN: CPT | Mod: PBBFAC,25 | Performed by: INTERNAL MEDICINE

## 2022-01-01 PROCEDURE — 63600175 PHARM REV CODE 636 W HCPCS: Performed by: FAMILY MEDICINE

## 2022-01-01 PROCEDURE — 83735 ASSAY OF MAGNESIUM: CPT | Performed by: FAMILY MEDICINE

## 2022-01-01 PROCEDURE — 85025 COMPLETE CBC W/AUTO DIFF WBC: CPT | Performed by: FAMILY MEDICINE

## 2022-01-01 PROCEDURE — 88112 CYTOPATH CELL ENHANCE TECH: CPT | Performed by: PATHOLOGY

## 2022-01-01 PROCEDURE — 89051 BODY FLUID CELL COUNT: CPT | Performed by: STUDENT IN AN ORGANIZED HEALTH CARE EDUCATION/TRAINING PROGRAM

## 2022-01-01 PROCEDURE — 20600001 HC STEP DOWN PRIVATE ROOM

## 2022-01-01 PROCEDURE — 99239 HOSP IP/OBS DSCHRG MGMT >30: CPT | Mod: ,,, | Performed by: STUDENT IN AN ORGANIZED HEALTH CARE EDUCATION/TRAINING PROGRAM

## 2022-01-01 PROCEDURE — 99232 PR SUBSEQUENT HOSPITAL CARE,LEVL II: ICD-10-PCS | Mod: ,,, | Performed by: STUDENT IN AN ORGANIZED HEALTH CARE EDUCATION/TRAINING PROGRAM

## 2022-01-01 PROCEDURE — 96374 THER/PROPH/DIAG INJ IV PUSH: CPT

## 2022-01-01 PROCEDURE — 99223 1ST HOSP IP/OBS HIGH 75: CPT | Mod: GC,,, | Performed by: INTERNAL MEDICINE

## 2022-01-01 PROCEDURE — 87070 CULTURE OTHR SPECIMN AEROBIC: CPT | Performed by: STUDENT IN AN ORGANIZED HEALTH CARE EDUCATION/TRAINING PROGRAM

## 2022-01-01 PROCEDURE — 94761 N-INVAS EAR/PLS OXIMETRY MLT: CPT

## 2022-01-01 PROCEDURE — 84100 ASSAY OF PHOSPHORUS: CPT | Performed by: FAMILY MEDICINE

## 2022-01-01 PROCEDURE — 88342 CHG IMMUNOCYTOCHEMISTRY: ICD-10-PCS | Mod: 26,,, | Performed by: PATHOLOGY

## 2022-01-01 PROCEDURE — 80053 COMPREHEN METABOLIC PANEL: CPT | Performed by: FAMILY MEDICINE

## 2022-01-01 PROCEDURE — 82042 OTHER SOURCE ALBUMIN QUAN EA: CPT | Performed by: STUDENT IN AN ORGANIZED HEALTH CARE EDUCATION/TRAINING PROGRAM

## 2022-01-01 PROCEDURE — 88341 IMHCHEM/IMCYTCHM EA ADD ANTB: CPT | Mod: 26,,, | Performed by: PATHOLOGY

## 2022-01-01 PROCEDURE — 99285 EMERGENCY DEPT VISIT HI MDM: CPT | Mod: 25,27

## 2022-01-01 PROCEDURE — 88305 TISSUE EXAM BY PATHOLOGIST: CPT | Performed by: PATHOLOGY

## 2022-01-01 PROCEDURE — 99497 PR ADVNCD CARE PLAN 30 MIN: ICD-10-PCS | Mod: ,,, | Performed by: STUDENT IN AN ORGANIZED HEALTH CARE EDUCATION/TRAINING PROGRAM

## 2022-01-01 PROCEDURE — 36415 COLL VENOUS BLD VENIPUNCTURE: CPT | Performed by: FAMILY MEDICINE

## 2022-01-01 PROCEDURE — 88112 CYTOPATH CELL ENHANCE TECH: CPT | Mod: 26,,, | Performed by: PATHOLOGY

## 2022-01-01 PROCEDURE — 97535 SELF CARE MNGMENT TRAINING: CPT

## 2022-01-01 PROCEDURE — 88305 TISSUE EXAM BY PATHOLOGIST: CPT | Mod: 26,,, | Performed by: PATHOLOGY

## 2022-01-01 PROCEDURE — 85610 PROTHROMBIN TIME: CPT | Performed by: PHYSICIAN ASSISTANT

## 2022-01-01 PROCEDURE — 88342 IMHCHEM/IMCYTCHM 1ST ANTB: CPT | Mod: 26,,, | Performed by: PATHOLOGY

## 2022-01-01 PROCEDURE — 25500020 PHARM REV CODE 255: Performed by: EMERGENCY MEDICINE

## 2022-01-01 PROCEDURE — 25000003 PHARM REV CODE 250: Performed by: STUDENT IN AN ORGANIZED HEALTH CARE EDUCATION/TRAINING PROGRAM

## 2022-01-01 PROCEDURE — 87205 SMEAR GRAM STAIN: CPT | Performed by: STUDENT IN AN ORGANIZED HEALTH CARE EDUCATION/TRAINING PROGRAM

## 2022-01-01 PROCEDURE — 99215 PR OFFICE/OUTPT VISIT, EST, LEVL V, 40-54 MIN: ICD-10-PCS | Mod: S$PBB,,, | Performed by: INTERNAL MEDICINE

## 2022-01-01 PROCEDURE — U0002 COVID-19 LAB TEST NON-CDC: HCPCS | Performed by: PHYSICIAN ASSISTANT

## 2022-01-01 PROCEDURE — 97165 OT EVAL LOW COMPLEX 30 MIN: CPT

## 2022-01-01 PROCEDURE — 88305 TISSUE EXAM BY PATHOLOGIST: ICD-10-PCS | Mod: 26,,, | Performed by: PATHOLOGY

## 2022-01-01 PROCEDURE — 99232 SBSQ HOSP IP/OBS MODERATE 35: CPT | Mod: ,,, | Performed by: STUDENT IN AN ORGANIZED HEALTH CARE EDUCATION/TRAINING PROGRAM

## 2022-01-01 PROCEDURE — 99223 PR INITIAL HOSPITAL CARE,LEVL III: ICD-10-PCS | Mod: ,,, | Performed by: FAMILY MEDICINE

## 2022-01-01 PROCEDURE — 97116 GAIT TRAINING THERAPY: CPT

## 2022-01-01 PROCEDURE — 87075 CULTR BACTERIA EXCEPT BLOOD: CPT | Performed by: STUDENT IN AN ORGANIZED HEALTH CARE EDUCATION/TRAINING PROGRAM

## 2022-01-01 PROCEDURE — 99497 ADVNCD CARE PLAN 30 MIN: CPT | Mod: ,,, | Performed by: STUDENT IN AN ORGANIZED HEALTH CARE EDUCATION/TRAINING PROGRAM

## 2022-01-01 PROCEDURE — 84157 ASSAY OF PROTEIN OTHER: CPT | Performed by: STUDENT IN AN ORGANIZED HEALTH CARE EDUCATION/TRAINING PROGRAM

## 2022-01-01 PROCEDURE — 99233 SBSQ HOSP IP/OBS HIGH 50: CPT | Mod: ,,, | Performed by: STUDENT IN AN ORGANIZED HEALTH CARE EDUCATION/TRAINING PROGRAM

## 2022-01-01 PROCEDURE — 99223 PR INITIAL HOSPITAL CARE,LEVL III: ICD-10-PCS | Mod: GC,,, | Performed by: INTERNAL MEDICINE

## 2022-01-01 PROCEDURE — 36415 COLL VENOUS BLD VENIPUNCTURE: CPT | Performed by: STUDENT IN AN ORGANIZED HEALTH CARE EDUCATION/TRAINING PROGRAM

## 2022-01-01 PROCEDURE — 83880 ASSAY OF NATRIURETIC PEPTIDE: CPT | Performed by: HOSPITALIST

## 2022-01-01 PROCEDURE — 25500020 PHARM REV CODE 255: Performed by: STUDENT IN AN ORGANIZED HEALTH CARE EDUCATION/TRAINING PROGRAM

## 2022-01-01 PROCEDURE — 82140 ASSAY OF AMMONIA: CPT | Performed by: PHYSICIAN ASSISTANT

## 2022-01-01 PROCEDURE — 86803 HEPATITIS C AB TEST: CPT | Performed by: EMERGENCY MEDICINE

## 2022-01-01 PROCEDURE — 99285 PR EMERGENCY DEPT VISIT,LEVEL V: ICD-10-PCS | Mod: CS,,, | Performed by: PHYSICIAN ASSISTANT

## 2022-01-01 PROCEDURE — 88341 PR IHC OR ICC EACH ADD'L SINGLE ANTIBODY  STAINPR: ICD-10-PCS | Mod: 26,,, | Performed by: PATHOLOGY

## 2022-01-01 PROCEDURE — 99223 1ST HOSP IP/OBS HIGH 75: CPT | Mod: ,,, | Performed by: FAMILY MEDICINE

## 2022-01-01 PROCEDURE — 97161 PT EVAL LOW COMPLEX 20 MIN: CPT

## 2022-01-01 PROCEDURE — 99233 PR SUBSEQUENT HOSPITAL CARE,LEVL III: ICD-10-PCS | Mod: ,,, | Performed by: STUDENT IN AN ORGANIZED HEALTH CARE EDUCATION/TRAINING PROGRAM

## 2022-01-01 PROCEDURE — 85025 COMPLETE CBC W/AUTO DIFF WBC: CPT | Performed by: STUDENT IN AN ORGANIZED HEALTH CARE EDUCATION/TRAINING PROGRAM

## 2022-01-01 PROCEDURE — 83615 LACTATE (LD) (LDH) ENZYME: CPT | Performed by: STUDENT IN AN ORGANIZED HEALTH CARE EDUCATION/TRAINING PROGRAM

## 2022-01-01 PROCEDURE — 99999 PR PBB SHADOW E&M-EST. PATIENT-LVL III: CPT | Mod: PBBFAC,,, | Performed by: INTERNAL MEDICINE

## 2022-01-01 PROCEDURE — 99999 PR PBB SHADOW E&M-EST. PATIENT-LVL III: ICD-10-PCS | Mod: PBBFAC,,, | Performed by: INTERNAL MEDICINE

## 2022-01-01 PROCEDURE — 82962 GLUCOSE BLOOD TEST: CPT

## 2022-01-01 PROCEDURE — A9585 GADOBUTROL INJECTION: HCPCS | Performed by: STUDENT IN AN ORGANIZED HEALTH CARE EDUCATION/TRAINING PROGRAM

## 2022-01-01 PROCEDURE — 25000003 PHARM REV CODE 250: Performed by: FAMILY MEDICINE

## 2022-01-01 PROCEDURE — 99239 PR HOSPITAL DISCHARGE DAY,>30 MIN: ICD-10-PCS | Mod: ,,, | Performed by: STUDENT IN AN ORGANIZED HEALTH CARE EDUCATION/TRAINING PROGRAM

## 2022-01-01 PROCEDURE — 63600175 PHARM REV CODE 636 W HCPCS: Performed by: PHYSICIAN ASSISTANT

## 2022-01-01 PROCEDURE — 80053 COMPREHEN METABOLIC PANEL: CPT | Performed by: PHYSICIAN ASSISTANT

## 2022-01-01 PROCEDURE — 88342 IMHCHEM/IMCYTCHM 1ST ANTB: CPT | Performed by: PATHOLOGY

## 2022-01-01 PROCEDURE — 88341 IMHCHEM/IMCYTCHM EA ADD ANTB: CPT | Performed by: PATHOLOGY

## 2022-01-01 PROCEDURE — 88112 PR  CYTOPATH, CELL ENHANCE TECH: ICD-10-PCS | Mod: 26,,, | Performed by: PATHOLOGY

## 2022-01-01 PROCEDURE — 99285 EMERGENCY DEPT VISIT HI MDM: CPT | Mod: CS,,, | Performed by: PHYSICIAN ASSISTANT

## 2022-01-01 RX ORDER — OXYCODONE HYDROCHLORIDE 5 MG/1
5 TABLET ORAL EVERY 4 HOURS PRN
Status: DISCONTINUED | OUTPATIENT
Start: 2022-01-01 | End: 2022-01-01

## 2022-01-01 RX ORDER — TALC
6 POWDER (GRAM) TOPICAL NIGHTLY PRN
Status: DISCONTINUED | OUTPATIENT
Start: 2022-01-01 | End: 2022-01-01 | Stop reason: HOSPADM

## 2022-01-01 RX ORDER — HYDROMORPHONE HYDROCHLORIDE 2 MG/1
2 TABLET ORAL EVERY 4 HOURS PRN
Status: DISCONTINUED | OUTPATIENT
Start: 2022-01-01 | End: 2022-01-01 | Stop reason: HOSPADM

## 2022-01-01 RX ORDER — ENOXAPARIN SODIUM 100 MG/ML
40 INJECTION SUBCUTANEOUS EVERY 24 HOURS
Status: CANCELLED | OUTPATIENT
Start: 2022-01-01

## 2022-01-01 RX ORDER — IBUPROFEN 200 MG
16 TABLET ORAL
Status: DISCONTINUED | OUTPATIENT
Start: 2022-01-01 | End: 2022-01-01 | Stop reason: HOSPADM

## 2022-01-01 RX ORDER — FUROSEMIDE 40 MG/1
40 TABLET ORAL EVERY MORNING
Status: ON HOLD | COMMUNITY
Start: 2022-01-01 | End: 2022-01-01 | Stop reason: HOSPADM

## 2022-01-01 RX ORDER — BETAMETHASONE VALERATE 1.2 MG/G
CREAM TOPICAL
Status: ON HOLD | COMMUNITY
Start: 2022-01-01 | End: 2022-01-01 | Stop reason: HOSPADM

## 2022-01-01 RX ORDER — IPRATROPIUM BROMIDE AND ALBUTEROL SULFATE 2.5; .5 MG/3ML; MG/3ML
3 SOLUTION RESPIRATORY (INHALATION) EVERY 6 HOURS PRN
Status: DISCONTINUED | OUTPATIENT
Start: 2022-01-01 | End: 2022-01-01 | Stop reason: HOSPADM

## 2022-01-01 RX ORDER — INSULIN ASPART 100 [IU]/ML
0-5 INJECTION, SOLUTION INTRAVENOUS; SUBCUTANEOUS
Status: DISCONTINUED | OUTPATIENT
Start: 2022-01-01 | End: 2022-01-01 | Stop reason: HOSPADM

## 2022-01-01 RX ORDER — SODIUM CHLORIDE 0.9 % (FLUSH) 0.9 %
10 SYRINGE (ML) INJECTION EVERY 12 HOURS PRN
Status: DISCONTINUED | OUTPATIENT
Start: 2022-01-01 | End: 2022-01-01 | Stop reason: HOSPADM

## 2022-01-01 RX ORDER — NALOXONE HCL 0.4 MG/ML
0.02 VIAL (ML) INJECTION
Status: DISCONTINUED | OUTPATIENT
Start: 2022-01-01 | End: 2022-01-01 | Stop reason: HOSPADM

## 2022-01-01 RX ORDER — ONDANSETRON 2 MG/ML
4 INJECTION INTRAMUSCULAR; INTRAVENOUS EVERY 8 HOURS PRN
Status: DISCONTINUED | OUTPATIENT
Start: 2022-01-01 | End: 2022-01-01 | Stop reason: HOSPADM

## 2022-01-01 RX ORDER — HYDROMORPHONE HYDROCHLORIDE 2 MG/1
1 TABLET ORAL 2 TIMES DAILY PRN
Status: ON HOLD | COMMUNITY
Start: 2022-01-01 | End: 2022-01-01 | Stop reason: SDUPTHER

## 2022-01-01 RX ORDER — GLUCAGON 1 MG
1 KIT INJECTION
Status: DISCONTINUED | OUTPATIENT
Start: 2022-01-01 | End: 2022-01-01 | Stop reason: HOSPADM

## 2022-01-01 RX ORDER — FUROSEMIDE 10 MG/ML
80 INJECTION INTRAMUSCULAR; INTRAVENOUS 2 TIMES DAILY
Status: DISCONTINUED | OUTPATIENT
Start: 2022-01-01 | End: 2022-01-01

## 2022-01-01 RX ORDER — FUROSEMIDE 10 MG/ML
80 INJECTION INTRAMUSCULAR; INTRAVENOUS
Status: COMPLETED | OUTPATIENT
Start: 2022-01-01 | End: 2022-01-01

## 2022-01-01 RX ORDER — MAG HYDROX/ALUMINUM HYD/SIMETH 200-200-20
30 SUSPENSION, ORAL (FINAL DOSE FORM) ORAL 4 TIMES DAILY PRN
Status: DISCONTINUED | OUTPATIENT
Start: 2022-01-01 | End: 2022-01-01 | Stop reason: HOSPADM

## 2022-01-01 RX ORDER — SENNOSIDES 8.6 MG/1
8.6 TABLET ORAL DAILY
Status: DISCONTINUED | OUTPATIENT
Start: 2022-01-01 | End: 2022-01-01 | Stop reason: HOSPADM

## 2022-01-01 RX ORDER — HYDROMORPHONE HYDROCHLORIDE 2 MG/1
2 TABLET ORAL EVERY 4 HOURS PRN
Qty: 30 TABLET | Refills: 0 | Status: SHIPPED | OUTPATIENT
Start: 2022-01-01

## 2022-01-01 RX ORDER — DULAGLUTIDE 0.75 MG/.5ML
0.75 INJECTION, SOLUTION SUBCUTANEOUS
Status: ON HOLD | COMMUNITY
Start: 2022-01-01 | End: 2022-01-01 | Stop reason: HOSPADM

## 2022-01-01 RX ORDER — ACETAMINOPHEN 500 MG
500 TABLET ORAL EVERY 4 HOURS PRN
Status: DISCONTINUED | OUTPATIENT
Start: 2022-01-01 | End: 2022-01-01 | Stop reason: HOSPADM

## 2022-01-01 RX ORDER — IBUPROFEN 200 MG
24 TABLET ORAL
Status: DISCONTINUED | OUTPATIENT
Start: 2022-01-01 | End: 2022-01-01 | Stop reason: HOSPADM

## 2022-01-01 RX ORDER — CARVEDILOL 3.12 MG/1
3.12 TABLET ORAL 2 TIMES DAILY WITH MEALS
Status: DISCONTINUED | OUTPATIENT
Start: 2022-01-01 | End: 2022-01-01

## 2022-01-01 RX ORDER — GADOBUTROL 604.72 MG/ML
10 INJECTION INTRAVENOUS
Status: COMPLETED | OUTPATIENT
Start: 2022-01-01 | End: 2022-01-01

## 2022-01-01 RX ORDER — SPIRONOLACTONE 25 MG/1
TABLET ORAL
Status: ON HOLD | COMMUNITY
Start: 2022-01-01 | End: 2022-01-01 | Stop reason: HOSPADM

## 2022-01-01 RX ADMIN — OXYCODONE 5 MG: 5 TABLET ORAL at 09:03

## 2022-01-01 RX ADMIN — FUROSEMIDE 80 MG: 10 INJECTION, SOLUTION INTRAMUSCULAR; INTRAVENOUS at 08:03

## 2022-01-01 RX ADMIN — HYDROMORPHONE HYDROCHLORIDE 2 MG: 2 TABLET ORAL at 10:04

## 2022-01-01 RX ADMIN — FUROSEMIDE 80 MG: 10 INJECTION, SOLUTION INTRAVENOUS at 05:03

## 2022-01-01 RX ADMIN — IOHEXOL 100 ML: 350 INJECTION, SOLUTION INTRAVENOUS at 06:03

## 2022-01-01 RX ADMIN — OXYCODONE 5 MG: 5 TABLET ORAL at 06:04

## 2022-01-01 RX ADMIN — Medication 6 MG: at 09:04

## 2022-01-01 RX ADMIN — FUROSEMIDE 80 MG: 10 INJECTION, SOLUTION INTRAMUSCULAR; INTRAVENOUS at 09:03

## 2022-01-01 RX ADMIN — OXYCODONE 5 MG: 5 TABLET ORAL at 05:03

## 2022-01-01 RX ADMIN — GADOBUTROL 10 ML: 604.72 INJECTION INTRAVENOUS at 05:04

## 2022-01-01 RX ADMIN — OXYCODONE 5 MG: 5 TABLET ORAL at 12:03

## 2022-01-01 RX ADMIN — FUROSEMIDE 80 MG: 10 INJECTION, SOLUTION INTRAMUSCULAR; INTRAVENOUS at 09:04

## 2022-01-01 RX ADMIN — SENNOSIDES 8.6 MG: 8.6 TABLET, FILM COATED ORAL at 06:04

## 2022-01-01 RX ADMIN — CARVEDILOL 3.12 MG: 3.12 TABLET, FILM COATED ORAL at 08:03

## 2022-01-01 RX ADMIN — HYDROMORPHONE HYDROCHLORIDE 2 MG: 2 TABLET ORAL at 05:04

## 2022-01-01 RX ADMIN — Medication 6 MG: at 12:03

## 2022-01-01 RX ADMIN — HYDROMORPHONE HYDROCHLORIDE 2 MG: 2 TABLET ORAL at 06:04

## 2022-01-01 RX ADMIN — SENNOSIDES 8.6 MG: 8.6 TABLET, FILM COATED ORAL at 08:04

## 2022-01-01 RX ADMIN — Medication 6 MG: at 09:03

## 2022-01-01 RX ADMIN — CARVEDILOL 3.12 MG: 3.12 TABLET, FILM COATED ORAL at 09:03

## 2022-03-14 NOTE — TELEPHONE ENCOUNTER
"----- Message from Dale Connelly sent at 3/14/2022 12:12 PM CDT -----  Regarding: Speak with office  Contact: Andrei  Consult/Advisory:           Name Of Caller: Diane Choudhury    Contact Preference?:  [phone]           Provider Name:     Does patient feel the need to be seen today? No           What is the nature of the call?:    Pt has received concerning labs and his daughter is calling to get him scheduled with .      Additional Notes:  "Thank you for all that you do for our patients'"     "

## 2022-03-14 NOTE — TELEPHONE ENCOUNTER
MA contacted pt and got in touch with his daughter Ms. Contreras told her I will ask provider prior to pt following up will he need any testing prior

## 2022-03-30 PROBLEM — J96.01 ACUTE HYPOXEMIC RESPIRATORY FAILURE: Status: ACTIVE | Noted: 2022-01-01

## 2022-03-30 PROBLEM — K74.60 DECOMPENSATED HEPATIC CIRRHOSIS: Status: ACTIVE | Noted: 2022-01-01

## 2022-03-30 PROBLEM — R60.1 ANASARCA: Status: ACTIVE | Noted: 2022-01-01

## 2022-03-30 PROBLEM — K72.90 DECOMPENSATED HEPATIC CIRRHOSIS: Status: ACTIVE | Noted: 2022-01-01

## 2022-03-30 NOTE — TELEPHONE ENCOUNTER
Patient seen in the clinic today 3/30/22 with Dr Hurtado. New Orders to follow.  Transport patient to the Emergency Dept - anasarca/ascites.    Patient already in wheel chair transport to ED accompanied by nurse, wife, daughter, and son in law. Patient received be triage nurse after report was given. Condition satisfactory.

## 2022-03-30 NOTE — ED NOTES
Patient identifiers verified and correct for Andrei Longo   C/C:Patient complains of abdominal pain, fluids overload, sent to the ED for evaluation, bilateral pedal edema   APPEARANCE: awake and alert in NAD.  SKIN: warm, dry and intact. No breakdown or bruising.  MUSCULOSKELETAL: Patient moving all extremities spontaneously, no obvious swelling or deformities noted. Ambulates independently.  RESPIRATORY: Shortness of breath SPO2 94% at room air .Respirations unlabored.   CARDIAC: Denies CP, 2+ distal pulses; bilateral pedal edema  ABDOMEN: S/ND/NT, Denies nausea  : Difficulties urinating   Neurologic: AAO x 4; follows commands equal strength in all extremities; denies numbness/tingling. Feeling izziness

## 2022-03-30 NOTE — ED PROVIDER NOTES
Encounter Date: 3/30/2022       History     Chief Complaint   Patient presents with    edema     Hx cirrhosis of liver, sent from clinic with anasarca and ascites     This is a 76 year old male with a PMH of DM, COPD, testicular ca, Diffuse large cell lymphoma, cirrhosis and portal hypertension referred from hepatology clinic with anasarca. Patient reports over the last several weeks he's become increasingly edematous. He reports abdominal distension and scrotal/penile edema. He has had difficulty urinating secondary to swelling. He also reports SOB, LÓPEZ, and dry nonproductive cough. He has failed to improve on low dose lasix and spironolactone. He also reports pain in the left upper back. Hepatology referred patient for admission to facilitate initial paracentesis, optimization of diuretics, and recommended non contrast CT chest and tripahsic CT abdomen in view of his hx of cancer. He denies fever, URI symptoms, active chest pain, vomiting, dysuria, rashes. He has had diarrhea up to 3x daily since January. He underwent stool studies and colonoscopy which were negative for acute findings.        Review of patient's allergies indicates:  No Known Allergies  Past Medical History:   Diagnosis Date    Anemia     Bleeding esophageal varices 8/9/2018    COPD (chronic obstructive pulmonary disease)     Diabetes mellitus     Encounter for blood transfusion     Portal hypertensive gastropathy     Portal hypertensive gastropathy     Testicular cancer     testicular     Past Surgical History:   Procedure Laterality Date    COLONOSCOPY N/A 2/22/2022    Procedure: COLONOSCOPY;  Surgeon: Pollo Wilkerson MD;  Location: [facility];  Service: Endoscopy;  Laterality: N/A;    ESOPHAGOGASTRODUODENOSCOPY N/A 8/8/2018    Procedure: EGD (ESOPHAGOGASTRODUODENOSCOPY);  Surgeon: Pollo Wilkerson MD;  Location: [facility];  Service: Endoscopy;  Laterality: N/A;    ESOPHAGOGASTRODUODENOSCOPY N/A 9/6/2018    Procedure:  ESOPHAGOGASTRODUODENOSCOPY (EGD);  Surgeon: Pollo Wilkerson MD;  [facility];  Service: Endoscopy;  Laterality: N/A;    ESOPHAGOGASTRODUODENOSCOPY N/A 12/31/2018    Procedure: ESOPHAGOGASTRODUODENOSCOPY (EGD);  Surgeon: Pollo Wilkerson MD;  [facility];  Service: Endoscopy;  Laterality: N/A;    ESOPHAGOGASTRODUODENOSCOPY N/A 12/17/2019    Procedure: ESOPHAGOGASTRODUODENOSCOPY (EGD);  Surgeon: Pollo Wilkerson MD;  [facility];  Service: Endoscopy;  Laterality: N/A;    ESOPHAGOGASTRODUODENOSCOPY N/A 12/14/2020    Procedure: EGD (ESOPHAGOGASTRODUODENOSCOPY);  Surgeon: Pollo Wilkerson MD;  Location[facility];  Service: Endoscopy;  Laterality: N/A;  ESOPH VARICES/COPD    ESOPHAGOGASTRODUODENOSCOPY W/ BANDING      TESTICLE SURGERY      testicular surgery       Family History   Problem Relation Age of Onset    Cancer Mother     Heart disease Father      Social History     Tobacco Use    Smoking status: Never Smoker    Smokeless tobacco: Never Used   Substance Use Topics    Alcohol use: Yes     Comment: OCC    Drug use: No     Review of Systems   Constitutional: Positive for appetite change and chills. Negative for fever.   HENT: Negative for sore throat.    Respiratory: Positive for cough and shortness of breath.    Cardiovascular: Positive for leg swelling. Negative for chest pain.   Gastrointestinal: Positive for abdominal distention and diarrhea. Negative for abdominal pain, nausea and vomiting.   Genitourinary: Positive for penile swelling and scrotal swelling. Negative for dysuria.   Musculoskeletal: Positive for back pain.   Skin: Negative for rash.   Neurological: Negative for weakness.   Hematological: Does not bruise/bleed easily.       Physical Exam     Initial Vitals [03/30/22 1440]   BP Pulse Resp Temp SpO2   (!) 106/51 76 20 97.9 °F (36.6 °C) 96 %      MAP       --         Physical Exam    Constitutional: He appears well-developed and well-nourished. He is  Obese . No distress. Face mask in place.   HENT:   Head: Atraumatic.   Eyes: Conjunctivae and EOM are normal. Pupils are equal, round, and reactive to light. Scleral icterus is present.   Cardiovascular: Normal rate, regular rhythm and normal heart sounds.   4+ bilateral pitting edema   Pulmonary/Chest: He has decreased breath sounds. He has rales in the right lower field and the left lower field.   Abdominal: Abdomen is soft and protuberant. Bowel sounds are normal. He exhibits distension. There is no abdominal tenderness.   Jaundiced     Neurological: He is alert and oriented to person, place, and time.   Skin: Skin is warm and dry. No rash noted. There is pallor.         ED Course   Procedures  Labs Reviewed   CBC W/ AUTO DIFFERENTIAL - Abnormal; Notable for the following components:       Result Value    RBC 3.46 (*)     Hemoglobin 10.9 (*)     Hematocrit 34.3 (*)     MCV 99 (*)     MCH 31.5 (*)     MCHC 31.8 (*)     RDW 20.6 (*)     Platelets 65 (*)     Immature Granulocytes 1.2 (*)     Immature Grans (Abs) 0.11 (*)     Lymph # 0.9 (*)     Mono # 1.8 (*)     Lymph % 10.3 (*)     Mono % 20.4 (*)     All other components within normal limits   COMPREHENSIVE METABOLIC PANEL - Abnormal; Notable for the following components:    Sodium 131 (*)     Potassium 5.4 (*)     Chloride 94 (*)     CO2 19 (*)     Glucose 112 (*)     BUN 29 (*)     Total Protein 5.7 (*)     Albumin 2.2 (*)     Total Bilirubin 8.9 (*)     Alkaline Phosphatase 158 (*)     AST 79 (*)     Anion Gap 18 (*)     All other components within normal limits   URINALYSIS, REFLEX TO URINE CULTURE - Abnormal; Notable for the following components:    Appearance, UA Hazy (*)     Bilirubin (UA) 1+ (*)     All other components within normal limits    Narrative:     Specimen Source->Urine   PROTIME-INR - Abnormal; Notable for the following components:    Prothrombin Time 13.0 (*)     INR 1.3 (*)     All other components within normal limits   URINALYSIS  MICROSCOPIC - Abnormal; Notable for the following components:    Hyaline Casts, UA 14 (*)     All other components within normal limits    Narrative:     Specimen Source->Urine   POCT GLUCOSE - Abnormal; Notable for the following components:    POCT Glucose 125 (*)     All other components within normal limits   AMMONIA   HEPATITIS C ANTIBODY   SARS-COV-2 RDRP GENE    Narrative:     This test utilizes isothermal nucleic acid amplification   technology to detect the SARS-CoV-2 RdRp nucleic acid segment.   The analytical sensitivity (limit of detection) is 125 genome   equivalents/mL.   A POSITIVE result implies infection with the SARS-CoV-2 virus;   the patient is presumed to be contagious.     A NEGATIVE result means that SARS-CoV-2 nucleic acids are not   present above the limit of detection. A NEGATIVE result should be   treated as presumptive. It does not rule out the possibility of   COVID-19 and should not be the sole basis for treatment decisions.   If COVID-19 is strongly suspected based on clinical and exposure   history, re-testing using an alternate molecular assay should be   considered.   This test is only for use under the Food and Drug   Administration s Emergency Use Authorization (EUA).   Commercial kits are provided by [employer].   Performance characteristics of the EUA have been independently   verified by [facility] Department of   Pathology and Laboratory Medicine.   _________________________________________________________________   The authorized Fact Sheet for Healthcare Providers and the authorized Fact   Sheet for Patients of the ID NOW COVID-19 are available on the FDA   website:     https://www.fda.gov/media/104386/download  https://www.fda.gov/media/846023/download                  Imaging Results          CT Abdomen Pelvis With Contrast (In process)                CT Chest Without Contrast (In process)                X-Ray Chest AP Portable (Final result)  Result  time 03/30/22 16:53:58    Final result by Liam Linares MD (03/30/22 16:53:58)                 Impression:      1. Left basilar subsegmental atelectasis, likely on the basis of shallow inspiratory effort, no large focal consolidation.  2. Ronda calcific appearing foci along the lateral aspect of the left midlung zone may reflect material external to the patient, not seen on the previous exam.      Electronically signed by: Liam Linares MD  Date:    03/30/2022  Time:    16:53             Narrative:    EXAMINATION:  XR CHEST AP PORTABLE    CLINICAL HISTORY:  anasarca;    TECHNIQUE:  Single frontal view of the chest was performed.    COMPARISON:  08/08/2018    FINDINGS:  The cardiomediastinal silhouette is prominent, similar to the previous exam noting magnification by technique..  There is no pleural effusion.  The trachea is midline.  The lungs are symmetrically expanded bilaterally with left basilar subsegmental atelectasis or scarring.  There are long appearing structures projected over the lateral aspect of the left midlung zone, may be external to the patient given their configuration..  No large focal consolidation seen.  There is no pneumothorax.  The osseous structures are remarkable for degenerative changes noting osteopenia..                                 Medications   furosemide injection 80 mg (80 mg Intravenous Given 3/30/22 1718)   iohexoL (OMNIPAQUE 350) injection 100 mL (100 mLs Intravenous Given 3/30/22 1857)     Medical Decision Making:   History:   Old Medical Records: I decided to obtain old medical records.  Old Records Summarized: records from clinic visits and records from previous admission(s).  Clinical Tests:   Lab Tests: Ordered and Reviewed  Radiological Study: Ordered and Reviewed  Medical Tests: Ordered and Reviewed  Other:   I have discussed this case with another health care provider.       APC / Resident Notes:   76 y.o. year old male presenting with edema.    DDx includes  but is not limited to decompensated liver failure, anasarca, renal failure, CHF.    Labs and imaging in process, will plan for admission.  Lasix given to initiate diuresis, maier placed as patient has difficulty urinating due to scrotal and penile edema.    Pertinent lab findings include H&H 10/34, Na 131, K 5.4, CO2 19, glucose 112, BUN 29, Total Bili 8.9, Alk phos 158, AST 79, Anion gap 19, INR 1.3, normal ammonia.    CXR with no focal consolidation.                      Clinical Impression:   Final diagnoses:  [R60.1] Anasarca (Primary)  [K74.60, R18.8] Cirrhosis of liver with ascites, unspecified hepatic cirrhosis type  [K72.00] Acute liver failure without hepatic coma          ED Disposition Condition    Admit               Sally Smith PA-C  03/30/22 1953

## 2022-03-30 NOTE — PROGRESS NOTES
Subjective:       Patient ID: Andrei Longo is a 76 y.o. male.    Chief Complaint: No chief complaint on file.    HPI  I saw this 76 y.o. man who came to the liver clinic with his wife. He is hard of hearing.  I last saw him in Feb 2021.    Originally referred by Dr Wilkerson    - variceal bleed 8/8/2018- banded  - at the time of his initial referral his imaging suggested an HCC.    EGD: 12/31/2018  - Grade II esophageal varices.                        - Portal hypertensive gastropathy.                        - Normal examined duodenum.                        - No specimens collected.  Was re-scoped by Dr Wilkerson in Dec 2020.    CT abdo: 8/8/2018 + MRI  Cirrhosis + 3.9cm caudate lobe mass  - suspicious for HCC      Liver biopsy: 9/12/2018  The biopsy shows multiple fragments of cirrhotic liver. Also, are present few necrotic foci. There is no definitive  neoplasm/malignancy in this specimen; however, it cannot be completely excluded. In view of clinical presentation  of a mass, representative issues are a consideration. Please correlate clinically with repeat sampling as indicated.  Few immunostains (CAM52, synaptophysin, CD3 AND CD20) have been performed with appropriate controls;  however, no viable cells are identified in the necrotic focus.    MRI abdo: 2/5/21  1. Cirrhosis.  No suspicious mass identified.  2. Mild splenomegaly with esophageal and left upper quadrant varices.       - Deteriorating over the last 3 months with significant ascites, leg edema to the upper leg and SOBOE    On tita 25 mg + furosemide 40 mg daily    MELD-Na score: 22 at 3/24/2022 10:00 AM  MELD score: 17 at 3/24/2022 10:00 AM  Calculated from:  Serum Creatinine: 0.69 mg/dL (Using min of 1 mg/dL) at 3/24/2022 10:00 AM  Serum Sodium: 131 mmol/L at 3/24/2022 10:00 AM  Total Bilirubin: 6.7 mg/dL at 3/24/2022 10:00 AM  INR(ratio): 1.3 at 3/24/2022 10:00 AM  Age: 76 years      PMH:  Cirrhosis  Testicular cancer- 25 years ago-  right side  Diffuse large cell lymphoma- 2007- shoulder/hip  - underwent chemo and some partial therapy at [facility]    DM- 3 years    SH:  Owns [employer]  Never drank a lot of alcohol  Never smoked    Review of Systems   Constitutional: Negative for activity change, appetite change, chills, fatigue, fever and unexpected weight change.   HENT: Negative for hearing loss.    Eyes: Negative for discharge and visual disturbance.   Respiratory: Positive for chest tightness and shortness of breath. Negative for cough and wheezing.    Cardiovascular: Positive for leg swelling. Negative for chest pain and palpitations.   Gastrointestinal: Positive for abdominal distention. Negative for abdominal pain, constipation, diarrhea and nausea.   Genitourinary: Negative for dysuria and frequency.   Musculoskeletal: Negative for arthralgias and back pain.   Skin: Negative for pallor and rash.   Neurological: Negative for dizziness, tremors, speech difficulty and headaches.   Hematological: Negative for adenopathy.   Psychiatric/Behavioral: Negative for agitation and confusion.           Lab Results   Component Value Date    ALT 34 03/24/2022     (H) 03/24/2022    ALKPHOS 157 (H) 03/24/2022    BILITOT 6.7 (H) 03/24/2022     Past Medical History:   Diagnosis Date    Anemia     Bleeding esophageal varices 8/9/2018    COPD (chronic obstructive pulmonary disease)     Diabetes mellitus     Encounter for blood transfusion     Portal hypertensive gastropathy     Portal hypertensive gastropathy     Testicular cancer     testicular     Past Surgical History:   Procedure Laterality Date    COLONOSCOPY N/A 2/22/2022    Procedure: COLONOSCOPY;  Surgeon: Pollo Wilkerson MD;  Location: [facility];  Service: Endoscopy;  Laterality: N/A;    ESOPHAGOGASTRODUODENOSCOPY N/A 8/8/2018    Procedure: EGD (ESOPHAGOGASTRODUODENOSCOPY);  Surgeon: Pollo Wilkerson MD;  Location: [facility];  Service: Endoscopy;  Laterality: N/A;     ESOPHAGOGASTRODUODENOSCOPY N/A 2018    Procedure: ESOPHAGOGASTRODUODENOSCOPY (EGD);  Surgeon: Pollo Wilkerson MD;  Location: [facility] END[facility];  Service: Endoscopy;  Laterality: N/A;    ESOPHAGOGASTRODUODENOSCOPY N/A 2018    Procedure: ESOPHAGOGASTRODUODENOSCOPY (EGD);  Surgeon: Pollo Wilkerson MD;  Location: [facility] ENDO;  Service: Endoscopy;  Laterality: N/A;    ESOPHAGOGASTRODUODENOSCOPY N/A 2019    Procedure: ESOPHAGOGASTRODUODENOSCOPY (EGD);  Surgeon: Pollo Wilkerson MD;  Location: [facility] END[facility];  Service: Endoscopy;  Laterality: N/A;    ESOPHAGOGASTRODUODENOSCOPY N/A 2020    Procedure: EGD (ESOPHAGOGASTRODUODENOSCOPY);  Surgeon: Pollo Wilkerson MD;  Location: [facility] ENDO;  Service: Endoscopy;  Laterality: N/A;  ESOPH VARICES/COPD    ESOPHAGOGASTRODUODENOSCOPY W/ BANDING      TESTICLE SURGERY      testicular surgery       Current Outpatient Medications   Medication Sig    betamethasone valerate 0.1% (VALISONE) 0.1 % Crea SMARTSIG:Sparingly Topical Twice Daily    carvediloL (COREG) 3.125 MG tablet Take 3.125 mg by mouth 2 (two) times daily with meals.    diphenoxylate-atropine 2.5-0.025 mg (LOMOTIL) 2.5-0.025 mg per tablet Take 1 tablet by mouth 4 (four) times daily as needed for Diarrhea.    furosemide (LASIX) 20 MG tablet Take 20 mg by mouth once daily.    HYDROmorphone (DILAUDID) 2 MG tablet Take 2 mg by mouth 2 (two) times daily as needed.    spironolactone (ALDACTONE) 25 MG tablet SMARTSI Tablet(s) By Mouth Every Evening    TRULICITY 0.75 mg/0.5 mL pen injector     metFORMIN (GLUCOPHAGE) 500 MG tablet Take 500 mg by mouth 2 (two) times daily with meals.     No current facility-administered medications for this visit.     Facility-Administered Medications Ordered in Other Visits   Medication    0.9%  NaCl infusion    0.9%  NaCl infusion       Objective:      Physical Exam  HENT:      Head: Normocephalic.   Eyes:      Pupils: Pupils are equal, round, and reactive to  light.   Neck:      Thyroid: No thyromegaly.   Cardiovascular:      Rate and Rhythm: Normal rate and regular rhythm.      Heart sounds: Normal heart sounds.   Pulmonary:      Effort: Pulmonary effort is normal.      Breath sounds: Normal breath sounds. No wheezing.   Abdominal:      General: There is distension.      Palpations: Abdomen is soft. There is no mass.      Tenderness: There is no abdominal tenderness.   Musculoskeletal:      Right lower leg: Edema present.      Left lower leg: Edema present.   Lymphadenopathy:      Cervical: No cervical adenopathy.   Skin:     General: Skin is warm.      Findings: No erythema or rash.   Neurological:      Mental Status: He is alert and oriented to person, place, and time.   Psychiatric:         Behavior: Behavior normal.         Assessment:       1. History of testicular cancer    2. Other cirrhosis of liver        Plan:   Already aware of the diagnosis of cirrhosis and portal hypertension.  Signifiacnt fluid retention despite low dose diuretics    - now has dificulty mobilizing and has some SOB and some posterios left chest pain    - Admit for paracentesis and oprimization of his diuretics  - also suggest non contrast CT chest and tripahsic CT abdo es in view of hx of cancer.

## 2022-03-31 PROBLEM — C83.30 DIFFUSE LARGE CELL LYMPHOMA IN REMISSION: Status: RESOLVED | Noted: 2018-08-24 | Resolved: 2022-01-01

## 2022-03-31 PROBLEM — C77.2 MALIGNANT NEOPLASM METASTATIC TO INTRA-ABDOMINAL LYMPH NODE: Status: ACTIVE | Noted: 2022-01-01

## 2022-03-31 NOTE — PLAN OF CARE
Problem: Physical Therapy  Goal: Physical Therapy Goal  Description: Goals to be met by: 22     Patient will increase functional independence with mobility by performin. Supine to sit with Modified [place]  2. Sit to supine with Modified [place]  3. Sit to stand transfer with Supervision  4. Gait  x 50 feet with Supervision using Rolling Walker.   5. Lower extremity exercise program x 20 reps per handout, with independence  6. Ascend/descend 5 stair with left Handrails Contact Guard Assistance using No Assistive Device.     Outcome: Ongoing, Progressing   Pt progressing, appropriate goals established

## 2022-03-31 NOTE — PLAN OF CARE
Jessyal initiated and POC established. Pt [place], though completed therapy with laith bardales.     Problem: Occupational Therapy  Goal: Occupational Therapy Goal  Description: Goals to be met by: 4/14/22     Patient will increase functional independence with ADLs by performing:    Grooming while standing at sink with Supervision.  Toileting from toilet with Supervision for hygiene and clothing management.   Supine to sit with Supervision.  Step transfer with Supervision and RW    Outcome: Ongoing, Progressing

## 2022-03-31 NOTE — SUBJECTIVE & OBJECTIVE
Interval History: No acute events overnight. Patient reports that today he feels about the same as yesterday, no improvement in his LE edema. He is curious when his MRI will be performed or when his paracentesis will be performed as well. Discussed being NPO for MRI but not necessarily for paracentesis.     Review of Systems   Constitutional:  Negative for chills and fever.   HENT:  Negative for congestion and sore throat.    Eyes:  Negative for photophobia and visual disturbance.   Respiratory:  Negative for cough and shortness of breath.    Cardiovascular:  Positive for leg swelling. Negative for chest pain and palpitations.   Gastrointestinal:  Positive for abdominal distention. Negative for abdominal pain, blood in stool, constipation, diarrhea, nausea and vomiting.   Endocrine: Negative for cold intolerance and heat intolerance.   Genitourinary:  Negative for dysuria and hematuria.   Musculoskeletal:  Negative for arthralgias and myalgias.   Skin:  Positive for color change. Negative for rash and wound.   Allergic/Immunologic: Negative for environmental allergies and food allergies.   Neurological:  Negative for seizures and numbness.   Hematological:  Negative for adenopathy. Does not bruise/bleed easily.   Psychiatric/Behavioral:  Negative for hallucinations and suicidal ideas.    Objective:     Vital Signs (Most Recent):  Temp: 98.2 °F (36.8 °C) (04/01/22 1125)  Pulse: 71 (04/01/22 1125)  Resp: 20 (04/01/22 1125)  BP: (!) 98/52 (04/01/22 1125)  SpO2: 96 % (04/01/22 1125)   Vital Signs (24h Range):  Temp:  [97.6 °F (36.4 °C)-98.5 °F (36.9 °C)] 97.6 °F (36.4 °C)  Pulse:  [70-85] 72  Resp:  [18-20] 18  SpO2:  [95 %-99 %] 97 %  BP: ()/(51-68) 95/52     Weight: 107.5 kg (237 lb)  Body mass index is 37.12 kg/m².    Intake/Output Summary (Last 24 hours) at 3/31/2022 1635  Last data filed at 3/31/2022 1100  Gross per 24 hour   Intake --   Output 1850 ml   Net -1850 ml      Physical Exam  Constitutional:        Appearance: He is well-developed.   HENT:      Head: Normocephalic and atraumatic.   Eyes:      General: Scleral icterus present.      Conjunctiva/sclera: Conjunctivae normal.      Pupils: Pupils are equal, round, and reactive to light.   Neck:      Thyroid: No thyromegaly.      Vascular: No JVD.   Cardiovascular:      Rate and Rhythm: Normal rate and regular rhythm.      Heart sounds: Normal heart sounds. No murmur heard.    No friction rub. No gallop.   Pulmonary:      Effort: Pulmonary effort is normal. No respiratory distress.      Breath sounds: Normal breath sounds. No wheezing or rales.   Abdominal:      General: Bowel sounds are normal. There is distension.      Palpations: Abdomen is soft. There is no mass.      Tenderness: There is no abdominal tenderness. There is no guarding or rebound.      Hernia: No hernia is present.   Musculoskeletal:         General: No deformity. Normal range of motion.      Cervical back: Normal range of motion and neck supple.      Right lower leg: Edema present.      Left lower leg: Edema present.   Skin:     General: Skin is warm and dry.      Coloration: Skin is jaundiced.   Neurological:      Mental Status: He is alert and oriented to person, place, and time.      Cranial Nerves: No cranial nerve deficit.   Psychiatric:         Behavior: Behavior normal.       Significant Labs: All pertinent labs within the past 24 hours have been reviewed.  CBC:   Recent Labs   Lab 03/30/22  1703 03/31/22  0527   WBC 9.02 7.23   HGB 10.9* 9.6*   HCT 34.3* 29.8*   PLT 65* 54*     CMP:   Recent Labs   Lab 03/30/22  1703 03/31/22  0527   * 129*   K 5.4* 4.7   CL 94* 94*   CO2 19* 21*   * 100   BUN 29* 32*   CREATININE 0.7 0.8   CALCIUM 9.3 8.7   PROT 5.7* 4.9*   ALBUMIN 2.2* 2.0*   BILITOT 8.9* 7.9*   ALKPHOS 158* 140*   AST 79* 62*   ALT 21 22   ANIONGAP 18* 14   EGFRNONAA >60.0 >60.0       Significant Imaging: I have reviewed all pertinent imaging results/findings within the  past 24 hours.

## 2022-03-31 NOTE — CONSULTS
Radiology Consult    Andrei Longo is a 76 y.o. male with decompensated cirrhosis, remote h/o testicular cancer, and DLBCL s/p chemo (2007) who was found to have finding consistent with metastatic disease on recent imaging noting peritoneal carcinomatosis, multiple liver lesions, abdominopelvic LAD, prostatomegaly, and an adrenal mass. IR consulted for lesion biopsy.     Discussed with primary team, Dr. Chris Guerra MD    Imaging reviewed with Radiology staff, Dr. Yusef Garner MD.     Procedure: metastatic lesion biopsy    Past Medical History:   Diagnosis Date    Anemia     Bleeding esophageal varices 8/9/2018    COPD (chronic obstructive pulmonary disease)     Diabetes mellitus     Encounter for blood transfusion     Portal hypertensive gastropathy     Portal hypertensive gastropathy     Testicular cancer     testicular     Past Surgical History:   Procedure Laterality Date    COLONOSCOPY N/A 2/22/2022    Procedure: COLONOSCOPY;  Surgeon: Pollo Wilkerson MD;  Location: [facility];  Service: Endoscopy;  Laterality: N/A;    ESOPHAGOGASTRODUODENOSCOPY N/A 8/8/2018    Procedure: EGD (ESOPHAGOGASTRODUODENOSCOPY);  Surgeon: Pollo Wilkerson MD;  Location: [facility];  Service: Endoscopy;  Laterality: N/A;    ESOPHAGOGASTRODUODENOSCOPY N/A 9/6/2018    Procedure: ESOPHAGOGASTRODUODENOSCOPY (EGD);  Surgeon: Pollo Wilkerson MD;  Location: [facility];  Service: Endoscopy;  Laterality: N/A;    ESOPHAGOGASTRODUODENOSCOPY N/A 12/31/2018    Procedure: ESOPHAGOGASTRODUODENOSCOPY (EGD);  Surgeon: Pollo Wilkerson MD;  Location: [facility];  Service: Endoscopy;  Laterality: N/A;    ESOPHAGOGASTRODUODENOSCOPY N/A 12/17/2019    Procedure: ESOPHAGOGASTRODUODENOSCOPY (EGD);  Surgeon: Pollo Wilkerson MD;  Location: [facility];  Service: Endoscopy;  Laterality: N/A;    ESOPHAGOGASTRODUODENOSCOPY N/A 12/14/2020    Procedure: EGD (ESOPHAGOGASTRODUODENOSCOPY);  Surgeon: Pollo Wilkerson MD;   "Location: [facility];  Service: Endoscopy;  Laterality: N/A;  ESOPH VARICES/COPD    ESOPHAGOGASTRODUODENOSCOPY W/ BANDING      TESTICLE SURGERY      testicular surgery         Scheduled Meds:    carvediloL  3.125 mg Oral BID WM    furosemide (LASIX) injection  80 mg Intravenous BID     Continuous Infusions:   PRN Meds:acetaminophen, albuterol-ipratropium, aluminum-magnesium hydroxide-simethicone, glucagon (human recombinant), glucose, glucose, insulin aspart U-100, melatonin, naloxone, ondansetron, oxyCODONE, sodium chloride 0.9%    Allergies: Review of patient's allergies indicates:  No Known Allergies    Labs:  Recent Labs   Lab 03/31/22 0527   INR 1.4*       Recent Labs   Lab 03/31/22 0527   WBC 7.23   HGB 9.6*   HCT 29.8*   MCV 97   PLT 54*      Recent Labs   Lab 03/31/22 0527      *   K 4.7   CL 94*   CO2 21*   BUN 32*   CREATININE 0.8   CALCIUM 8.7   MG 1.8   ALT 22   AST 62*   ALBUMIN 2.0*   BILITOT 7.9*         Vitals (Most Recent):  Temp: 98.2 °F (36.8 °C) (03/31/22 1135)  Pulse: 72 (03/31/22 1135)  Resp: 18 (03/31/22 1135)  BP: (!) 99/51 (03/31/22 1135)  SpO2: 95 % (03/31/22 1135)    Plan:   IR does not typically perform non-urgent diagnostic biopsies as inpatient due to limited resources.  However, we would be happy to schedule the patient for a biopsy, most likely of a liver lesion, as an outpatient.  Please place an order upon discharge for "[id number]" to refer patient to our IR clinic and our  will contact him.  Feel free to call or epic secure chat if you have any further questions or concerns.         Bre Garcia MD   Department of Radiology  PGY V Resident  Pager: [phone]      "

## 2022-03-31 NOTE — PHARMACY MED REC
"Admission Medication History     The home medication history was taken by Keya Bourne.    You may go to "Admission" then "Reconcile Home Medications" tabs to review and/or act upon these items.      The home medication list has been updated by the Pharmacy department.    Please read ALL comments highlighted in yellow.    Please address this information as you see fit.     Feel free to contact us if you have any questions or require assistance.    The medications listed below were removed from the home medication list. Please reorder if appropriate:  Patient reports no longer taking the following medication(s):   METFORMIN 500 MG    Medications listed below were obtained from: Family and Medications brought from home     PTA Medications   Medication Sig    betamethasone valerate 0.1% (VALISONE) 0.1 % Crea   Apply sparingly twice a day as needed for skin irritation    BIOFREEZE, MENTHOL, TOP     Apply 1 application topically as needed (Shoulder pain).    carvediloL (COREG) 3.125 MG tablet   Take 3.125 mg by mouth 2 (two) times daily with meals.    diphenoxylate-atropine 2.5-0.025 mg (LOMOTIL) 2.5-0.025 mg per tablet   Take 1 tablet by mouth 2 (two) times daily as needed for Diarrhea.    furosemide (LASIX) 40 MG tablet   Take 40 mg by mouth every morning.    HYDROmorphone (DILAUDID) 2 MG tablet   Take 1 mg by mouth 2 (two) times daily as needed.    spironolactone (ALDACTONE) 25 MG tablet   SMARTSI Tablet(s) By Mouth Every Evening    TRULICITY 0.75 mg/0.5 mL pen injector Inject 0.75 mg into the skin every 7 days.       Keya Bourne, [facility]  EXT [id number]                  .          "

## 2022-03-31 NOTE — CONSULTS
[facility]  Gastroenterology  Consult Note    Patient Name: Andrei Longo  MRN: [id number]  Admission Date: 3/30/2022  Hospital Length of Stay: 1 days  Code Status: Full Code   Attending Provider: Chris William MD   Consulting Provider: Nikita Hein MD  Primary Care Physician: Damian Yee MD  Principal Problem:Anasarca    Inpatient consult to Hepatology  Consult performed by: Nikita Hein MD  Consult ordered by: Damian Lane MD        Subjective:     HPI:   Mr Longo is a 77yo PMHx decompensated biopsy proven cirrhosis (EVH, ascites, ?possible HCC on prior imaging), HTN, COPD, ID-T2DM, testicular cancer (25 years ago), DLBCL s/p chemo (2007) admitted from clinic for paracentesis and further imaging for evaluation of HCC.     Unsure if he takes diuretics at home. No abd pain or prior paracentesis. No melena, hematochezia, hematemesis, coffee ground emesis    Patient was last seen in clinic 03/30 for which he was noted to have volume overload w/ SOB--recommended admission for para and optimization of duiretics. Also recommended non con CT chest and triphasic abd CT for eval of mass.Patient has been decompensated by prior EVH 2018 for which time he underwent banding. His initial imaging c/f possible HCC on 08/2018 MRI. He also had a liver biopsy 09/2018 demonstrating multiple fragments cirrhotic liver w/ necrotic foci. MR 02/2021 w/o suspicious mass identified.     Colonoscopy 02/22/2022 for heme positive stool notable for a 6mm polyp in transverse, 5mm polyp in descending, 10mm polyp in sigmoid all removed w/ hot snare. Path for polyps came back as tubular adenoma.     Prior EGD 12/2020 for EV surveillance w/ grade 1 EV and PHG.    VS since arrival notable for AF, HR wnl, normotensive. CBC w/o leukocytosis, Hgb 10.9-->9.6 (appears hemodilutional, b/l one week prior 10.9), plts 54. BMP w/ Na 129, BUN/ Cr 0.8/ 32. LFTs w/ AST/ ALT 79/ 21-->62/ 22, -->140, T bili  8.9-->7.9. INR 1.3-->1.4. One week prior AST/ / 34, KCU083, T bili 6.7, INR 1.3. CT chest w/o contrast w/o mass. CTAP triple phase notable for significantly enlarged ill defined prostate increased in size and multiple ill defined hypoattenuating hepatic masses best seen on portal venous phase and absence of arterial enhancement more suggestive of metastatic disease than multifocal HCC. Multiple enlarged LN c/f metastatic disease and small peritoneal nodules c/f carcinomatosis and new adrenal mass. US liver w/ cholelithiasis and cirrhosis and ascites.       Past Medical History:   Diagnosis Date    Anemia     Bleeding esophageal varices 8/9/2018    COPD (chronic obstructive pulmonary disease)     Diabetes mellitus     Encounter for blood transfusion     Portal hypertensive gastropathy     Portal hypertensive gastropathy     Testicular cancer     testicular       Past Surgical History:   Procedure Laterality Date    COLONOSCOPY N/A 2/22/2022    Procedure: COLONOSCOPY;  Surgeon: Pollo Wilkerson MD;  Location: [facility];  Service: Endoscopy;  Laterality: N/A;    ESOPHAGOGASTRODUODENOSCOPY N/A 8/8/2018    Procedure: EGD (ESOPHAGOGASTRODUODENOSCOPY);  Surgeon: Pollo Wilkerson MD;  Location: [facility];  Service: Endoscopy;  Laterality: N/A;    ESOPHAGOGASTRODUODENOSCOPY N/A 9/6/2018    Procedure: ESOPHAGOGASTRODUODENOSCOPY (EGD);  Surgeon: Pollo Wilkerson MD;  Location: [facility];  Service: Endoscopy;  Laterality: N/A;    ESOPHAGOGASTRODUODENOSCOPY N/A 12/31/2018    Procedure: ESOPHAGOGASTRODUODENOSCOPY (EGD);  Surgeon: Pollo Wilkerson MD;  Location: [facility];  Service: Endoscopy;  Laterality: N/A;    ESOPHAGOGASTRODUODENOSCOPY N/A 12/17/2019    Procedure: ESOPHAGOGASTRODUODENOSCOPY (EGD);  Surgeon: Pollo Wilkerson MD;  Location: [facility];  Service: Endoscopy;  Laterality: N/A;    ESOPHAGOGASTRODUODENOSCOPY N/A 12/14/2020    Procedure: EGD (ESOPHAGOGASTRODUODENOSCOPY);  Surgeon:  Pollo Wilkerson MD;  Location: [facility];  Service: Endoscopy;  Laterality: N/A;  ESOPH VARICES/COPD    ESOPHAGOGASTRODUODENOSCOPY W/ BANDING      TESTICLE SURGERY      testicular surgery         Family History   Problem Relation Age of Onset    Cancer Mother     Heart disease Father        Social History     Socioeconomic History    Marital status:    Tobacco Use    Smoking status: Never Smoker    Smokeless tobacco: Never Used   Substance and Sexual Activity    Alcohol use: Yes     Comment: OCC    Drug use: No       Current Facility-Administered Medications on File Prior to Encounter   Medication Dose Route Frequency Provider Last Rate Last Admin    0.9%  NaCl infusion   Intravenous Continuous Pollo Wilkerson MD 20 mL/hr at 20 0645 20 mL/hr at 20 0645    0.9%  NaCl infusion   Intravenous Continuous Pollo Wilkerson MD   Stopped at 22 0850     Current Outpatient Medications on File Prior to Encounter   Medication Sig Dispense Refill    betamethasone valerate 0.1% (VALISONE) 0.1 % Crea SMARTSIG:Sparingly Topical Twice Daily      carvediloL (COREG) 3.125 MG tablet Take 3.125 mg by mouth 2 (two) times daily with meals.      diphenoxylate-atropine 2.5-0.025 mg (LOMOTIL) 2.5-0.025 mg per tablet Take 1 tablet by mouth 4 (four) times daily as needed for Diarrhea.      furosemide (LASIX) 20 MG tablet Take 20 mg by mouth once daily.      HYDROmorphone (DILAUDID) 2 MG tablet Take 2 mg by mouth 2 (two) times daily as needed.      metFORMIN (GLUCOPHAGE) 500 MG tablet Take 500 mg by mouth 2 (two) times daily with meals.      spironolactone (ALDACTONE) 25 MG tablet SMARTSI Tablet(s) By Mouth Every Evening      TRULICITY 0.75 mg/0.5 mL pen injector          Review of patient's allergies indicates:  No Known Allergies    Review of Systems   Constitutional: Negative.    HENT: Negative.    Eyes: Negative.    Respiratory: Negative.    Cardiovascular: Negative.     Gastrointestinal: Negative.    Genitourinary: Negative.    Musculoskeletal: Negative.    Neurological: Negative.    Endo/Heme/Allergies: Negative.    Psychiatric/Behavioral: Negative.         Objective:     Vitals:    03/31/22 0026   BP:    Pulse:    Resp: 20   Temp:          Constitutional:  not in acute distress and well developed  HENT: Head: Normal, normocephalic, atraumatic.  Eyes: conjunctiva clear and sclera icteric  Cardiovascular: regular rate and rhythm  Respiratory: normal chest expansion & respiratory effort   and no accessory muscle use  GI: soft, distended and nontender  Skin: jaundice present  Neurological: alert, oriented x3  Psychiatric: mood and affect are within normal limits, pt is a good historian; no memory problems were noted    Significant Labs:  Recent Labs   Lab 03/24/22  1000 03/30/22  1703 03/31/22  0527   HGB 10.9* 10.9* 9.6*       Lab Results   Component Value Date    WBC 7.23 03/31/2022    HGB 9.6 (L) 03/31/2022    HCT 29.8 (L) 03/31/2022    MCV 97 03/31/2022    PLT 54 (L) 03/31/2022       Lab Results   Component Value Date     (L) 03/31/2022    K 4.7 03/31/2022    CL 94 (L) 03/31/2022    CO2 21 (L) 03/31/2022    BUN 32 (H) 03/31/2022    CREATININE 0.8 03/31/2022    CALCIUM 8.7 03/31/2022    ANIONGAP 14 03/31/2022    ESTGFRAFRICA >60.0 03/31/2022    EGFRNONAA >60.0 03/31/2022       Lab Results   Component Value Date    ALT 22 03/31/2022    AST 62 (H) 03/31/2022    ALKPHOS 140 (H) 03/31/2022    BILITOT 7.9 (H) 03/31/2022       Lab Results   Component Value Date    INR 1.4 (H) 03/31/2022    INR 1.3 (H) 03/30/2022    INR 1.3 (H) 03/24/2022       Significant Imaging:  Reviewed pertinent radiology findings.       Assessment/Plan:      75yo PMHx decompensated biopsy proven cirrhosis (EVH, ascites, ?possible HCC on prior imaging), HTN, COPD, ID-T2DM, testicular cancer (25 years ago), DLBCL s/p chemo (2007) admitted from clinic for paracentesis and further imaging for evaluation of  HCC.     Exam c/f ascites. Labs w/ apparent cholestatic liver injury. Imaging here c/f metastatic disease (peritoneal carcinomatosis, multiple liver lesions, adrenal mass, prostatomegaly).    Will plan for paracentesis and IR guided biopsy of lymph node.    Problem List:  1. Metastatic disease, possible prostate primary  2. Decompensated cirrhosis    Recommendations:  1. Paracentesis w/ SBP labs and cytology  2. IR consult for biopsy of tissue    Thank you for involving us in the care of Andrei Longo. Please call with any additional questions, concerns or changes in the patient's clinical status. We will continue to follow.    Nikita Hein MD  Gastroenterology Fellow PGY V  [facility]

## 2022-03-31 NOTE — ASSESSMENT & PLAN NOTE
Patient with Hypoxic Respiratory failure which is Acute.  he is not on home oxygen. Supplemental oxygen was provided and noted-  .   Signs/symptoms of respiratory failure include- hypoxia and dyspnea. Contributing diagnoses includes - Anasarca Labs and images were reviewed. Patient Has not had a recent ABG. Will treat underlying causes and adjust management of respiratory failure as follows- Diuresis and further management see anasarca above

## 2022-03-31 NOTE — ASSESSMENT & PLAN NOTE
- MELD Na score 23  - hepatology consulted ; appreciate recs  - for further management see Anasarca above

## 2022-03-31 NOTE — ASSESSMENT & PLAN NOTE
Ssecondary to decompensated liver cirrhosis. TTE relatively unremarkable, CVP normal.   - start lasix 80mg iv BID  - low sodium fluid restricted diet  - strict I/Os and daily weights  - Diagnostic paracentesis ordered  - monitor tele  - Liver US with doppler pending  - Hepatology consulted ; appreciate recs  - PT/OT consulted  ;  Appreciate recs  - further management pending clinical course and follow up

## 2022-03-31 NOTE — ASSESSMENT & PLAN NOTE
- secondary to decompensated liver cirrhosis and possibly new onset HF?  - start lasix 80mg iv BID  - low sodium fluid restricted diet  - strict I/Os and daily weights  - continue maier cath  - anticipate need for IR diagnostic/therapeutic paracentesis in am  - follow up CT abdomen and CT chest   - supplemental O2 and monitor pulseOx  - monitor tele  - ECHO pending  - Liver US with doppler pending  - Hepatology consulted ; appreciate recs  - PT/OT consulted  ;  Appreciate recs  - further management pending clinical course and follow up

## 2022-03-31 NOTE — PT/OT/SLP EVAL
"Occupational Therapy  Co- Evaluation    Co-evaluation/treatment performed due to patient's multiple deficits requiring two skilled therapists to appropriately and safely assess patient's strength and endurance while facilitating functional tasks in addition to accommodating for patient's activity tolerance.       Name: Andrei Longo  MRN: [id number]  Admitting Diagnosis:  Anasarca  Recent Surgery: * No surgery found *      Recommendations:     Discharge Recommendations: nursing facility, skilled  Discharge Equipment Recommendations:  grab bar  Barriers to discharge:  None    Assessment:     Andrei Longo is a 76 y.o. male with a medical diagnosis of Anasarca.  He presents with performance deficits affecting function: weakness, impaired endurance, impaired sensation, gait instability, impaired functional mobilty, impaired self care skills, pain, decreased safety awareness, edema, impaired skin.  Patient is [place], though cooperated with therapy with laith bardales though session initiated with patient expressing urgency to use toilet, requiring repeated prompts from OT for safety during pt initiated mobility to toilet. SNF recommended for discharge.     Rehab Prognosis: Good; patient would benefit from acute skilled OT services to address these deficits and reach maximum level of function.       Plan:     Patient to be seen 3 x/week to address the above listed problems via self-care/home management, therapeutic activities, therapeutic exercises  · Plan of Care Expires: 04/14/22  · Plan of Care Reviewed with: patient, daughter, spouse    Subjective     Chief Complaint: "I'm moving around fine, I'm just not feeling well. I've been getting weaker"   Patient/Family Comments/goals: Get stronger, get better, go home.     Occupational Profile:  Living Environment: Lives in 1SH with wife, 5 JONA with R  Previous level of function: Walked with single point cane occasionally inside, more frequently outside of " the home. Indep in ADLs.   Roles and Routines: , father  Equipment Used at Home:  cane, straight   At home but not used: RW, hospital bed with trapeze bar without mattress  Assistance upon Discharge: wife/daughter    Pain/Comfort:  · Pain Rating 1:  (not rated)  · Location - Side 1: Bilateral  · Location - Orientation 1: lower  · Location 1: leg  · Pain Addressed 1: Distraction  · Pain Addressed 2: Distraction    Patients cultural, spiritual, Sikhism conflicts given the current situation: no    Objective:     Communicated with: SARTHAK Cisneros prior to session.  Patient found sitting EOB with oxygen (not using it), peripheral IV, pulse ox (continuous), telemetry, maier catheter upon OT entry to room.    General Precautions: Standard, fall ([place])   Orthopedic Precautions:N/A   Braces: N/A  Respiratory Status: Nasal cannula, flow 3 L/min    Occupational Performance:    Bed Mobility:    · Patient completed Scooting/Bridging with stand by assistance    Functional Mobility/Transfers:  · Patient completed Sit <> Stand Transfer from EOB with contact guard assistance  with  rolling walker, multiple verbal cues for safety precautions due to impulsivity  · Patient completed Stand <> Sit Tx to toilet with CGA and RW  · Patient completed Sit <> Stand from toilet with CGA and RW with forward trunk flex while OT provided beni-care/PT supported pt in standing   · Patient completed Toilet Step Transfer technique with contact guard assistance with  rolling walker  · Patient completed Step transfer to upright chair with CGA and RW x2 persons after pt communicated lightheadedness  · Pt completed Stand <> Sit transfer to upright chair with CGA and RW x2 persons  · Functional Mobility: Pt engaging in functional mobility to simulate household/community distances with CGA and utilizing RW in order to maximize functional activity tolerance and standing balance required for engagement in occupations of choice.       Activities of Daily  Living:  · Toileting: maximal assistance completing beni-care and managing clothes with patient in standing    Cognitive/Visual Perceptual:  Cognitive/Psychosocial Skills:     -       Oriented to: AOx4   -       Follows Commands/attention:Follows multistep  commands  -       Communication: clear/fluent  -       Memory: No Deficits noted  -       Safety awareness/insight to disability: impaired   -       Mood/Affect/Coping skills/emotional control: Appropriate to situation, Lashes out, Anxious and Pleasant after toileting needs were met  Visual/Perceptual:      -Intact      Physical Exam:  Balance:    -       Intact  Postural examination/scapula alignment:    -       Rounded shoulders  -       Forward head  Skin integrity: Visible skin intact  Edema:  Mild BLE  Sensation:    -       Impaired  light/touch BLE and proprioception BLE, pamela feet  Motor Planning:    -       Intact  Dominant hand:    -       Right  Upper Extremity Range of Motion:     -       Right Upper Extremity: WFL  -       Left Upper Extremity: WFL  Upper Extremity Strength:    -       Right Upper Extremity: WFL  -       Left Upper Extremity: WFL   Strength:    -       Right Upper Extremity: WFL  -       Left Upper Extremity: WFL  Fine Motor Coordination:    -       Intact  Gross motor coordination:   WFL  Neurological:    -       Intact    AMPAC 6 Click ADL:  AMPAC Total Score: 18    Treatment & Education:   Pt educated on role of OT, POC, and goals for therapy.     Patient and family aware of patient's deficits and therapy progression.    Time provided for therapeutic counseling and discussion of health disposition.    Educated on importance of EOB/OOB mobility, maintaining routine, sitting up in chair, and maximizing independence with ADLs during admission    Pt completed ADLs and functional mobility for treatment session as noted above    Pt/caregiver verbalized understanding and expressed no further concerns/questions.   Updated  communication board with therapist names and level of assist required    Education:    Patient left up in chair with all lines intact, call button in reach, RN notified and wife/daughter present    GOALS:   Multidisciplinary Problems     Occupational Therapy Goals        Problem: Occupational Therapy    Goal Priority Disciplines Outcome Interventions   Occupational Therapy Goal     OT, PT/OT Ongoing, Progressing    Description: Goals to be met by: 4/14/22     Patient will increase functional independence with ADLs by performing:    Grooming while standing at sink with Supervision.  Toileting from toilet with Supervision for hygiene and clothing management.   Supine to sit with Supervision.  Step transfer with Supervision and RW                     History:     Past Medical History:   Diagnosis Date    Anemia     Bleeding esophageal varices 8/9/2018    COPD (chronic obstructive pulmonary disease)     Diabetes mellitus     Encounter for blood transfusion     Portal hypertensive gastropathy     Portal hypertensive gastropathy     Testicular cancer     testicular       Past Surgical History:   Procedure Laterality Date    COLONOSCOPY N/A 2/22/2022    Procedure: COLONOSCOPY;  Surgeon: Pollo Wilkerson MD;  Location: [facility];  Service: Endoscopy;  Laterality: N/A;    ESOPHAGOGASTRODUODENOSCOPY N/A 8/8/2018    Procedure: EGD (ESOPHAGOGASTRODUODENOSCOPY);  Surgeon: Pollo Wilkerson MD;  Location: [facility];  Service: Endoscopy;  Laterality: N/A;    ESOPHAGOGASTRODUODENOSCOPY N/A 9/6/2018    Procedure: ESOPHAGOGASTRODUODENOSCOPY (EGD);  Surgeon: Pollo Wilkerson MD;  Location: [facility];  Service: Endoscopy;  Laterality: N/A;    ESOPHAGOGASTRODUODENOSCOPY N/A 12/31/2018    Procedure: ESOPHAGOGASTRODUODENOSCOPY (EGD);  Surgeon: Pollo Wilkerson MD;  Location: [facility];  Service: Endoscopy;  Laterality: N/A;    ESOPHAGOGASTRODUODENOSCOPY N/A 12/17/2019    Procedure: ESOPHAGOGASTRODUODENOSCOPY (EGD);   Surgeon: Pollo Wilkerson MD;  [facility];  Service: Endoscopy;  Laterality: N/A;    ESOPHAGOGASTRODUODENOSCOPY N/A 12/14/2020    Procedure: EGD (ESOPHAGOGASTRODUODENOSCOPY);  Surgeon: Pollo Wilkerson MD;  Location: [facility];  Service: Endoscopy;  Laterality: N/A;  ESOPH VARICES/COPD    ESOPHAGOGASTRODUODENOSCOPY W/ BANDING      TESTICLE SURGERY      testicular surgery         Time Tracking:     OT Date of Treatment: 03/31/22  OT Start Time: 0948  OT Stop Time: 1014  OT Total Time (min): 26 min    Billable Minutes:Evaluation 10  Self Care/Home Management 10    3/31/2022

## 2022-03-31 NOTE — ASSESSMENT & PLAN NOTE
MELD-Na score: 24 at 3/31/2022  5:27 AM  MELD score: 18 at 3/31/2022  5:27 AM  Calculated from:  Serum Creatinine: 0.8 mg/dL (Using min of 1 mg/dL) at 3/31/2022  5:27 AM  Serum Sodium: 129 mmol/L at 3/31/2022  5:27 AM  Total Bilirubin: 7.9 mg/dL at 3/31/2022  5:27 AM  INR(ratio): 1.4 at 3/31/2022  5:27 AM  Age: 76 years  Not a transplant candidate 2/2 multiple mets from unknown primary  - Diueresing and ordering paracentesis to assist with volume removal  - hepatology consulted ; appreciate recs  - for further management see Anasarca above

## 2022-03-31 NOTE — NURSING
Pt arrived to unit accompanied by hospital transport.  Pt aaox4, no s./s of acute distress noted  at this time.

## 2022-03-31 NOTE — H&P
[facility] - Emergency Dept  [facility] Medicine  History & Physical    Patient Name: Andrei Longo  MRN: [id number]  Patient Class: IP- Inpatient  Admission Date: 3/30/2022  Attending Physician: Nguyen Ferris MD   Primary Care Provider: Damian Yee MD         Patient information was obtained from patient, past medical records and ER records.     Subjective:     Principal Problem:Anasarca    Chief Complaint:   Chief Complaint   Patient presents with    edema     Hx cirrhosis of liver, sent from clinic with anasarca and ascites        HPI: This is a 77yo male with a past medical history of liver cirrhosis of unclear etiology, COPD, DM, testicular cancer, diffuse large cell lymphoma, and portal HTN who presents to the ED from hepatology clinic for anasarca. Patient reports progressively worsening abdominal and lower and upper extremity edema that has been worsening over several weeks. He reports associated shortness of breath, fatigue, dry non-productive cough, scrotal/penile edema. He was started on po lasix and aldactone days ago without any improvement and was seen for follow up today at hepatology clinic and was subsequently sent to ED for further eval and management. In the ED patient afebrile and hemodynamically stable saturating well on 3L NC. Denies fevers, chills, chest pain, nausea, vomiting, abdominal pain, confusion. Patient started on iv lasix and [place] placed in ED. Patient admitted to the care of medicine for further evaluation and management.      Past Medical History:   Diagnosis Date    Anemia     Bleeding esophageal varices 8/9/2018    COPD (chronic obstructive pulmonary disease)     Diabetes mellitus     Encounter for blood transfusion     Portal hypertensive gastropathy     Portal hypertensive gastropathy     Testicular cancer     testicular       Past Surgical History:   Procedure Laterality Date    COLONOSCOPY N/A 2/22/2022    Procedure: COLONOSCOPY;  Surgeon: Pollo JOSEPH  MD Rock;  [facility];  Service: Endoscopy;  Laterality: N/A;    ESOPHAGOGASTRODUODENOSCOPY N/A 8/8/2018    Procedure: EGD (ESOPHAGOGASTRODUODENOSCOPY);  Surgeon: Pollo Wilkerson MD;  [facility];  Service: Endoscopy;  Laterality: N/A;    ESOPHAGOGASTRODUODENOSCOPY N/A 9/6/2018    Procedure: ESOPHAGOGASTRODUODENOSCOPY (EGD);  Surgeon: Pollo Wilkerson MD;  [facility];  Service: Endoscopy;  Laterality: N/A;    ESOPHAGOGASTRODUODENOSCOPY N/A 12/31/2018    Procedure: ESOPHAGOGASTRODUODENOSCOPY (EGD);  Surgeon: Pollo Wilkerson MD;  [facility];  Service: Endoscopy;  Laterality: N/A;    ESOPHAGOGASTRODUODENOSCOPY N/A 12/17/2019    Procedure: ESOPHAGOGASTRODUODENOSCOPY (EGD);  Surgeon: Pollo Wilkerson MD;  [facility];  Service: Endoscopy;  Laterality: N/A;    ESOPHAGOGASTRODUODENOSCOPY N/A 12/14/2020    Procedure: EGD (ESOPHAGOGASTRODUODENOSCOPY);  Surgeon: Pollo Wilkerson MD;  Location: [facility];  Service: Endoscopy;  Laterality: N/A;  ESOPH VARICES/COPD    ESOPHAGOGASTRODUODENOSCOPY W/ BANDING      TESTICLE SURGERY      testicular surgery         Review of patient's allergies indicates:  No Known Allergies    Current Facility-Administered Medications on File Prior to Encounter   Medication    0.9%  NaCl infusion    0.9%  NaCl infusion     Current Outpatient Medications on File Prior to Encounter   Medication Sig    betamethasone valerate 0.1% (VALISONE) 0.1 % Crea SMARTSIG:Sparingly Topical Twice Daily    carvediloL (COREG) 3.125 MG tablet Take 3.125 mg by mouth 2 (two) times daily with meals.    diphenoxylate-atropine 2.5-0.025 mg (LOMOTIL) 2.5-0.025 mg per tablet Take 1 tablet by mouth 4 (four) times daily as needed for Diarrhea.    furosemide (LASIX) 20 MG tablet Take 20 mg by mouth once daily.    HYDROmorphone (DILAUDID) 2 MG tablet Take 2 mg by mouth 2 (two) times daily as needed.    metFORMIN (GLUCOPHAGE) 500 MG tablet Take 500 mg  by mouth 2 (two) times daily with meals.    spironolactone (ALDACTONE) 25 MG tablet SMARTSI Tablet(s) By Mouth Every Evening    TRULICITY 0.75 mg/0.5 mL pen injector      Family History       Problem Relation (Age of Onset)    Cancer Mother    Heart disease Father          Tobacco Use    Smoking status: Never Smoker    Smokeless tobacco: Never Used   Substance and Sexual Activity    Alcohol use: Yes     Comment: OCC    Drug use: No    Sexual activity: Not on file     Review of Systems   Constitutional:  Positive for activity change, fatigue and unexpected weight change. Negative for chills, diaphoresis and fever.   HENT:  Negative for sore throat and trouble swallowing.    Eyes:  Negative for photophobia and visual disturbance.   Respiratory:  Positive for cough and shortness of breath. Negative for wheezing.    Cardiovascular:  Positive for leg swelling. Negative for chest pain and palpitations.   Gastrointestinal:  Positive for abdominal distention and diarrhea. Negative for abdominal pain, nausea and vomiting.   Genitourinary:  Negative for dysuria and hematuria.   Musculoskeletal:  Positive for arthralgias, back pain and gait problem. Negative for neck pain and neck stiffness.   Skin:  Negative for rash and wound.   Neurological:  Positive for weakness (generalized). Negative for seizures, syncope, numbness and headaches.   Psychiatric/Behavioral:  Negative for confusion.    Objective:     Vital Signs (Most Recent):  Temp: 97.9 °F (36.6 °C) (22 1440)  Pulse: 72 (22 1840)  Resp: 20 (22 1440)  BP: (!) 111/57 (22 1840)  SpO2: 96 % (22 1840)   Vital Signs (24h Range):  Temp:  [96.5 °F (35.8 °C)-97.9 °F (36.6 °C)] 97.9 °F (36.6 °C)  Pulse:  [72-76] 72  Resp:  [18-20] 20  SpO2:  [96 %-99 %] 96 %  BP: (104-114)/(51-58) 111/57     Weight: 107.7 kg (237 lb 7 oz)  Body mass index is 37.19 kg/m².    Physical Exam  Constitutional:       General: He is not in acute distress.      Appearance: He is obese. He is not toxic-appearing or diaphoretic.   HENT:      Head: Normocephalic and atraumatic.      Nose: Nose normal.   Eyes:      General: Scleral icterus present.      Extraocular Movements: Extraocular movements intact.      Pupils: Pupils are equal, round, and reactive to light.   Cardiovascular:      Rate and Rhythm: Normal rate and regular rhythm.   Pulmonary:      Effort: Pulmonary effort is normal. No respiratory distress.      Breath sounds: Rales present. No wheezing.   Abdominal:      General: Abdomen is flat. There is distension.      Palpations: Abdomen is soft.      Tenderness: There is no abdominal tenderness. There is no guarding.   Musculoskeletal:         General: Normal range of motion.      Cervical back: Normal range of motion and neck supple. No rigidity.      Right lower leg: Edema present.      Left lower leg: Edema present.   Skin:     General: Skin is warm and dry.      Coloration: Skin is jaundiced.   Neurological:      General: No focal deficit present.      Mental Status: He is alert and oriented to person, place, and time.      Cranial Nerves: No cranial nerve deficit.   Psychiatric:         Mood and Affect: Mood normal.         Behavior: Behavior normal.         CRANIAL NERVES     CN III, IV, VI   Pupils are equal, round, and reactive to light.     Significant Labs: All pertinent labs within the past 24 hours have been reviewed.  CBC:   Recent Labs   Lab 03/30/22  1703   WBC 9.02   HGB 10.9*   HCT 34.3*   PLT 65*     CMP:   Recent Labs   Lab 03/30/22  1703   *   K 5.4*   CL 94*   CO2 19*   *   BUN 29*   CREATININE 0.7   CALCIUM 9.3   PROT 5.7*   ALBUMIN 2.2*   BILITOT 8.9*   ALKPHOS 158*   AST 79*   ALT 21   ANIONGAP 18*   EGFRNONAA >60.0       Significant Imaging: I have reviewed all pertinent imaging results/findings within the past 24 hours.    Assessment/Plan:     * Anasarca  - secondary to decompensated liver cirrhosis and possibly new onset  HF?  - start lasix 80mg iv BID  - low sodium fluid restricted diet  - strict I/Os and daily weights  - continue maier cath  - anticipate need for IR diagnostic/therapeutic paracentesis in am  - follow up CT abdomen and CT chest   - supplemental O2 and monitor pulseOx  - monitor tele  - ECHO pending  - Liver US with doppler pending  - Hepatology consulted ; appreciate recs  - PT/OT consulted  ;  Appreciate recs  - further management pending clinical course and follow up      Acute hypoxemic respiratory failure  Patient with Hypoxic Respiratory failure which is Acute.  he is not on home oxygen. Supplemental oxygen was provided and noted-  .   Signs/symptoms of respiratory failure include- hypoxia and dyspnea. Contributing diagnoses includes - Anasarca Labs and images were reviewed. Patient Has not had a recent ABG. Will treat underlying causes and adjust management of respiratory failure as follows- Diuresis and further management see anasarca above    Decompensated hepatic cirrhosis  - MELD Na score 23  - hepatology consulted ; appreciate recs  - for further management see Anasarca above      Diabetes mellitus  - SSI  - hypoglycemic protocol        VTE Risk Mitigation (From admission, onward)         Ordered     IP VTE HIGH RISK PATIENT  Once         03/30/22 2016     Place sequential compression device  Until discontinued         03/30/22 2016                   Damian Lane MD  Department of Hospital Medicine   [facility] - Emergency Dept

## 2022-03-31 NOTE — PT/OT/SLP EVAL
Physical Therapy Co Evaluation and Tx    Patient Name:  Andrei Longo   MRN:  [id number]  Admit Date: 3/30/2022  Admitting Diagnosis:  Anasarca  Length of Stay: 1 days  Recent Surgery: * No surgery found *    *Co treatment of 2 skilled therapists indicated in order to maximize function and safety of Pt.  Recommendations:   Discharge Recommendations:  nursing facility, skilled   Discharge Equipment Recommendations: grab bar   Barriers to discharge: increased assist required      Assessment:     Andrei Longo is a 76 y.o. male admitted with a medical diagnosis of Anasarca.  Pt presents with functional mobility deficits and is functioning below baseline. Pt participated well throughout session, but is limited by dyspnea on exertion and lower body edema. Pt will continue to benefit from acute PT services in order to maximize safety and (I) with functional mobility.    Problem List: weakness, impaired endurance, impaired sensation, impaired functional mobilty, gait instability, impaired balance, pain, impaired self care skills, edema, impaired skin  Rehab Prognosis: Good; patient would benefit from acute skilled PT services to address these deficits and reach maximum level of function.        Plan:   During this hospitalization, patient to be seen 3 x/week to address the above listed problems via gait training, therapeutic activities, therapeutic exercises, neuromuscular re-education  · Plan of Care Expires:       Subjective     Chief Complaint: edema (Hx cirrhosis of liver, sent from clinic with anasarca and ascites)    Patient/Family Comments/goals: to get better and go home  Pain/Comfort:  · Pain Rating 1: other (see comments) (not rated)  · Location - Side 1: Bilateral  · Location - Orientation 1: lower  · Location 1: leg  · Pain Addressed 1: Distraction, Cessation of Activity  · Pain Rating Post-Intervention 1: other (see comments) (not rated)    Social History:  Residence: Lives in [address] with wife, 5  JONA with [facility]  Support available: family  Equipment Used: cane, straight, walker, rolling, other (see comments) (at home but not used: hospital bed with trapeze bar without mattress)  Prior level of function: mod(I) with SPC, occasionally using RW    Objective:     Communicated with RN prior to session.  Patient found HOB elevated upon PT entry to room.   Additional staff present: OT    General Precautions: Standard, fall, other (see comments) (hearing impaired)   Orthopedic Precautions:N/A   Braces: N/A   Oxygen Device: Nasal Cannula 3L    Exams:  · Cognition:   · AxOx 4  · Command following: Follows multistep verbal commands    · Postural Exam:  Patient presented with the following abnormalities:    · -       Rounded shoulders  · -       Forward head  · -       Abnormal trunk flexion  · Sensation:    · -       Intact    · RLE ROM: WFL  · RLE Strength: WFL  · LLE ROM: WFL  · LLE Strength: WFL    Functional Mobility:  Bed Mobility:     Scooting: stand by assistance  Supine to Sit: stand by assistance  Transfers:     Sit to Stand:  contact guard assistance with rolling walker  Toilet Transfer: contact guard assistance with  rolling walker  using  Step Transfer  Gait:   · Distance: ~20 ft  · Level of Assistance:  contact guard assistance  · AD used: rolling walker  · Gait Deviations: decreased speed, step length, swing through, heel strike, forward flexed posture, and downward gaze.   · Comments: PT providing verbal and tactile cues for improved upright posture, gaze direction, AD management, and gait fluidity.   Balance:   · Static Sitting: SPV  · Dynamic Sitting: SBA   · Static Standing: SCA  · Dynamic Standing: CGA    Therapeutic Activities & Exercises:      Role of PT in POC   Patient educated on the importance of early mobility to prevent functional decline during hospital stay   Patient was instructed to utilize staff assistance for mobility/transfers.   Patient was educated on PT POC and all questions  answered within PT scope of practice.   Patient able to verbalize understanding; will follow-up with pt during current admit for additional questions/concerns within scope of practice.     Outcome Measures:  AM-PAC 6 CLICK MOBILITY  Turning over in bed (including adjusting bedclothes, sheets and blankets)?: 3  Sitting down on and standing up from a chair with arms (e.g., wheelchair, bedside commode, etc.): 3  Moving from lying on back to sitting on the side of the bed?: 3  Moving to and from a bed to a chair (including a wheelchair)?: 3  Need to walk in hospital room?: 3  Climbing 3-5 steps with a railing?: 2  Basic Mobility Total Score: 17     Patient left up in chair with all lines intact, call button in reach, RN notified and wife and daughter present.    GOALS:   Multidisciplinary Problems     Physical Therapy Goals        Problem: Physical Therapy    Goal Priority Disciplines Outcome Goal Variances Interventions   Physical Therapy Goal     PT, PT/OT Ongoing, Progressing     Description: Goals to be met by: 22     Patient will increase functional independence with mobility by performin. Supine to sit with Modified [place]  2. Sit to supine with Modified [place]  3. Sit to stand transfer with Supervision  4. Gait  x 50 feet with Supervision using Rolling Walker.   5. Lower extremity exercise program x 20 reps per handout, with independence  6. Ascend/descend 5 stair with left Handrails Contact Guard Assistance using No Assistive Device.                      History:     Past Medical History:   Diagnosis Date    Anemia     Bleeding esophageal varices 2018    COPD (chronic obstructive pulmonary disease)     Diabetes mellitus     Encounter for blood transfusion     Portal hypertensive gastropathy     Portal hypertensive gastropathy     Testicular cancer     testicular       Past Surgical History:   Procedure Laterality Date    COLONOSCOPY N/A 2022    Procedure: COLONOSCOPY;   Surgeon: Pollo Wilkerson MD;  Location: [facility];  Service: Endoscopy;  Laterality: N/A;    ESOPHAGOGASTRODUODENOSCOPY N/A 8/8/2018    Procedure: EGD (ESOPHAGOGASTRODUODENOSCOPY);  Surgeon: Pollo Wilkerson MD;  Location: [facility];  Service: Endoscopy;  Laterality: N/A;    ESOPHAGOGASTRODUODENOSCOPY N/A 9/6/2018    Procedure: ESOPHAGOGASTRODUODENOSCOPY (EGD);  Surgeon: Pollo Wilkerson MD;  Location: [facility];  Service: Endoscopy;  Laterality: N/A;    ESOPHAGOGASTRODUODENOSCOPY N/A 12/31/2018    Procedure: ESOPHAGOGASTRODUODENOSCOPY (EGD);  Surgeon: Pollo Wilkerson MD;  Location: [facility];  Service: Endoscopy;  Laterality: N/A;    ESOPHAGOGASTRODUODENOSCOPY N/A 12/17/2019    Procedure: ESOPHAGOGASTRODUODENOSCOPY (EGD);  Surgeon: Pollo Wilkerson MD;  Location: [facility];  Service: Endoscopy;  Laterality: N/A;    ESOPHAGOGASTRODUODENOSCOPY N/A 12/14/2020    Procedure: EGD (ESOPHAGOGASTRODUODENOSCOPY);  Surgeon: Pollo Wilkerson MD;  Location: [facility];  Service: Endoscopy;  Laterality: N/A;  ESOPH VARICES/COPD    ESOPHAGOGASTRODUODENOSCOPY W/ BANDING      TESTICLE SURGERY      testicular surgery         Family History   Problem Relation Age of Onset    Cancer Mother     Heart disease Father        Social History     Socioeconomic History    Marital status:    Tobacco Use    Smoking status: Never Smoker    Smokeless tobacco: Never Used   Substance and Sexual Activity    Alcohol use: Yes     Comment: OCC    Drug use: No     Time Tracking:     PT Received On: 03/31/22  PT Start Time: 0956     PT Stop Time: 1016  PT Total Time (min): 20 min     Billable Minutes: Evaluation 10 and Gait Training 10    3/31/2022

## 2022-03-31 NOTE — PROGRESS NOTES
Jared Sosa - Telemetry [facility] Medicine  Progress Note    Patient Name: Andrei Longo  MRN: [id number]  Patient Class: IP- Inpatient   Admission Date: 3/30/2022  Length of Stay: 1 days  Attending Physician: Chris William MD  Primary Care Provider: Damian Yee MD        Subjective:     Principal Problem:Anasarca        HPI:  This is a 77yo male with a past medical history of liver cirrhosis of unclear etiology, COPD, DM, testicular cancer, diffuse large cell lymphoma, and portal HTN who presents to the ED from hepatology clinic for anasarca. Patient reports progressively worsening abdominal and lower and upper extremity edema that has been worsening over several weeks. He reports associated shortness of breath, fatigue, dry non-productive cough, scrotal/penile edema. He was started on po lasix and aldactone days ago without any improvement and was seen for follow up today at hepatology clinic and was subsequently sent to ED for further eval and management. In the ED patient afebrile and hemodynamically stable saturating well on 3L NC. Denies fevers, chills, chest pain, nausea, vomiting, abdominal pain, confusion. Patient started on iv lasix and Washburn placed in ED. Patient admitted to the care of medicine for further evaluation and management.      Overview/Hospital Course:  Informed patient about his likely metastatic cancer of unknown primary. Mentioned possible paracentesis to help identify main source given peritoneal carcinomatosis. IR unable to perform LN bx inpatient. Continuing anasarca treatment with lasix.       Interval History: No acute events since admission. Patient denies any issues other than abdominal distension. He is admittedly disappointed to find out about the possible cancer. He reports hx of testicular cancer and then additional cancer (DLBCL) which he has been in remission for about 20~ years.     Review of Systems   Constitutional:  Negative for chills and fever.    HENT:  Negative for congestion and sore throat.    Eyes:  Negative for photophobia and visual disturbance.   Respiratory:  Negative for cough and shortness of breath.    Cardiovascular:  Positive for leg swelling. Negative for chest pain and palpitations.   Gastrointestinal:  Positive for abdominal distention. Negative for abdominal pain, blood in stool, constipation, diarrhea, nausea and vomiting.   Endocrine: Negative for cold intolerance and heat intolerance.   Genitourinary:  Negative for dysuria and hematuria.   Musculoskeletal:  Negative for arthralgias and myalgias.   Skin:  Positive for color change. Negative for rash and wound.   Allergic/Immunologic: Negative for environmental allergies and food allergies.   Neurological:  Negative for seizures and numbness.   Hematological:  Negative for adenopathy. Does not bruise/bleed easily.   Psychiatric/Behavioral:  Negative for hallucinations and suicidal ideas.    Objective:     Vital Signs (Most Recent):  Temp: 97.6 °F (36.4 °C) (03/31/22 1528)  Pulse: 72 (03/31/22 1528)  Resp: 18 (03/31/22 1528)  BP: (!) 95/52 (03/31/22 1528)  SpO2: 97 % (03/31/22 1528)   Vital Signs (24h Range):  Temp:  [97.6 °F (36.4 °C)-98.5 °F (36.9 °C)] 97.6 °F (36.4 °C)  Pulse:  [70-85] 72  Resp:  [18-20] 18  SpO2:  [95 %-99 %] 97 %  BP: ()/(51-68) 95/52     Weight: 107.5 kg (237 lb)  Body mass index is 37.12 kg/m².    Intake/Output Summary (Last 24 hours) at 3/31/2022 1635  Last data filed at 3/31/2022 1100  Gross per 24 hour   Intake --   Output 1850 ml   Net -1850 ml      Physical Exam  Constitutional:       Appearance: He is well-developed.   HENT:      Head: Normocephalic and atraumatic.   Eyes:      General: Scleral icterus present.      Conjunctiva/sclera: Conjunctivae normal.      Pupils: Pupils are equal, round, and reactive to light.   Neck:      Thyroid: No thyromegaly.      Vascular: No JVD.   Cardiovascular:      Rate and Rhythm: Normal rate and regular rhythm.       Heart sounds: Normal heart sounds. No murmur heard.    No friction rub. No gallop.   Pulmonary:      Effort: Pulmonary effort is normal. No respiratory distress.      Breath sounds: Normal breath sounds. No wheezing or rales.   Abdominal:      General: Bowel sounds are normal. There is distension.      Palpations: Abdomen is soft. There is no mass.      Tenderness: There is no abdominal tenderness. There is no guarding or rebound.      Hernia: No hernia is present.   Musculoskeletal:         General: No deformity. Normal range of motion.      Cervical back: Normal range of motion and neck supple.      Right lower leg: Edema present.      Left lower leg: Edema present.   Skin:     General: Skin is warm and dry.      Coloration: Skin is jaundiced.   Neurological:      Mental Status: He is alert and oriented to person, place, and time.      Cranial Nerves: No cranial nerve deficit.   Psychiatric:         Behavior: Behavior normal.       Significant Labs: All pertinent labs within the past 24 hours have been reviewed.  CBC:   Recent Labs   Lab 03/30/22  1703 03/31/22  0527   WBC 9.02 7.23   HGB 10.9* 9.6*   HCT 34.3* 29.8*   PLT 65* 54*     CMP:   Recent Labs   Lab 03/30/22  1703 03/31/22  0527   * 129*   K 5.4* 4.7   CL 94* 94*   CO2 19* 21*   * 100   BUN 29* 32*   CREATININE 0.7 0.8   CALCIUM 9.3 8.7   PROT 5.7* 4.9*   ALBUMIN 2.2* 2.0*   BILITOT 8.9* 7.9*   ALKPHOS 158* 140*   AST 79* 62*   ALT 21 22   ANIONGAP 18* 14   EGFRNONAA >60.0 >60.0       Significant Imaging: I have reviewed all pertinent imaging results/findings within the past 24 hours.      Assessment/Plan:      * Anasarca  Ssecondary to decompensated liver cirrhosis. TTE relatively unremarkable, CVP normal.   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  he is not on home oxygen. Supplemental oxygen was provided and noted-  .   Signs/symptoms of respiratory failure include- hypoxia and dyspnea. Contributing diagnoses includes - Anasarca Labs and images were reviewed. Patient Has not had a recent ABG. Will treat underlying causes and adjust management of respiratory failure as follows- Diuresis and further management see anasarca above    Decompensated hepatic cirrhosis  MELD-Na score: 24 at 3/31/2022  5:27 AM  MELD score: 18 at 3/31/2022  5:27 AM  Calculated from:  Serum Creatinine: 0.8 mg/dL (Using min of 1 mg/dL) at 3/31/2022  5:27 AM  Serum Sodium: 129 mmol/L at 3/31/2022  5:27 AM  Total Bilirubin: 7.9 mg/dL at 3/31/2022  5:27 AM  INR(ratio): 1.4 at 3/31/2022  5:27 AM  Age: 76 years  Not a transplant candidate 2/2 multiple mets from unknown primary  - Diueresing and ordering paracentesis to assist with volume removal  - hepatology consulted ; appreciate recs  - for further management see Anasarca above      Diabetes mellitus  - SSI  - hypoglycemic protocol        VTE Risk Mitigation (From admission, onward)         Ordered     IP VTE HIGH RISK PATIENT  Once         03/30/22 2016     Place sequential compression device  Until discontinued         03/30/22 2016                Discharge Planning   MILENA: 4/4/2022     Code Status: Full Code   Is the patient medically ready for discharge?:     Reason for patient still in hospital (select all that apply): Patient trending condition                      Chris William MD  Department of Hospital Medicine   [facility] - Telemetry Stepdown

## 2022-03-31 NOTE — HOSPITAL COURSE
Informed patient about his likely metastatic cancer of unknown primary. Mentioned possible paracentesis to help identify main source given peritoneal carcinomatosis. IR unable to perform LN bx inpatient. Continuing anasarca treatment with lasix. Patient received paracentesis 4/1, sent for cytology. MRI was performed 4/2 that showed new hepatic masses, left adrenal mass, and lymphadenopathy within the lower thorax and abdomen. Patient and wife discussed that he does not want chemotherapy under any circumstances. He states that he wants to go home and does not want to proceed with tissue diagnosis in the future. Patient to be set up with  with transition to hospice once he arrives home in [place].

## 2022-03-31 NOTE — ASSESSMENT & PLAN NOTE
Unknown primary, patient has hx of DLBCL and testicular cancer  - Will attempt to get diagnostic para w/ cytology to help find etiology  - IR unable to perform LN bx inpatient, will need outpatient f/u  - MRI abdomen ordered

## 2022-03-31 NOTE — SUBJECTIVE & OBJECTIVE
Past Medical History:   Diagnosis Date    Anemia     Bleeding esophageal varices 8/9/2018    COPD (chronic obstructive pulmonary disease)     Diabetes mellitus     Encounter for blood transfusion     Portal hypertensive gastropathy     Portal hypertensive gastropathy     Testicular cancer     testicular       Past Surgical History:   Procedure Laterality Date    COLONOSCOPY N/A 2/22/2022    Procedure: COLONOSCOPY;  Surgeon: Pollo Wilkerson MD;  Location: [facility];  Service: Endoscopy;  Laterality: N/A;    ESOPHAGOGASTRODUODENOSCOPY N/A 8/8/2018    Procedure: EGD (ESOPHAGOGASTRODUODENOSCOPY);  Surgeon: Pollo Wilkerson MD;  Location: [facility];  Service: Endoscopy;  Laterality: N/A;    ESOPHAGOGASTRODUODENOSCOPY N/A 9/6/2018    Procedure: ESOPHAGOGASTRODUODENOSCOPY (EGD);  Surgeon: Pollo Wilkerson MD;  Location: [facility];  Service: Endoscopy;  Laterality: N/A;    ESOPHAGOGASTRODUODENOSCOPY N/A 12/31/2018    Procedure: ESOPHAGOGASTRODUODENOSCOPY (EGD);  Surgeon: Pollo Wilkerson MD;  Location: [facility];  Service: Endoscopy;  Laterality: N/A;    ESOPHAGOGASTRODUODENOSCOPY N/A 12/17/2019    Procedure: ESOPHAGOGASTRODUODENOSCOPY (EGD);  Surgeon: Pollo Wilkerson MD;  Location: [facility];  Service: Endoscopy;  Laterality: N/A;    ESOPHAGOGASTRODUODENOSCOPY N/A 12/14/2020    Procedure: EGD (ESOPHAGOGASTRODUODENOSCOPY);  Surgeon: Pollo Wilkerson MD;  Location: [facility];  Service: Endoscopy;  Laterality: N/A;  ESOPH VARICES/COPD    ESOPHAGOGASTRODUODENOSCOPY W/ BANDING      TESTICLE SURGERY      testicular surgery         Review of patient's allergies indicates:  No Known Allergies    Current Facility-Administered Medications on File Prior to Encounter   Medication    0.9%  NaCl infusion    0.9%  NaCl infusion     Current Outpatient Medications on File Prior to Encounter   Medication Sig    betamethasone valerate 0.1% (VALISONE) 0.1 % Crea SMARTSIG:Sparingly Topical Twice Daily    carvediloL (COREG)  3.125 MG tablet Take 3.125 mg by mouth 2 (two) times daily with meals.    diphenoxylate-atropine 2.5-0.025 mg (LOMOTIL) 2.5-0.025 mg per tablet Take 1 tablet by mouth 4 (four) times daily as needed for Diarrhea.    furosemide (LASIX) 20 MG tablet Take 20 mg by mouth once daily.    HYDROmorphone (DILAUDID) 2 MG tablet Take 2 mg by mouth 2 (two) times daily as needed.    metFORMIN (GLUCOPHAGE) 500 MG tablet Take 500 mg by mouth 2 (two) times daily with meals.    spironolactone (ALDACTONE) 25 MG tablet SMARTSI Tablet(s) By Mouth Every Evening    TRULICITY 0.75 mg/0.5 mL pen injector      Family History       Problem Relation (Age of Onset)    Cancer Mother    Heart disease Father          Tobacco Use    Smoking status: Never Smoker    Smokeless tobacco: Never Used   Substance and Sexual Activity    Alcohol use: Yes     Comment: OCC    Drug use: No    Sexual activity: Not on file     Review of Systems   Constitutional:  Positive for activity change, fatigue and unexpected weight change. Negative for chills, diaphoresis and fever.   HENT:  Negative for sore throat and trouble swallowing.    Eyes:  Negative for photophobia and visual disturbance.   Respiratory:  Positive for cough and shortness of breath. Negative for wheezing.    Cardiovascular:  Positive for leg swelling. Negative for chest pain and palpitations.   Gastrointestinal:  Positive for abdominal distention and diarrhea. Negative for abdominal pain, nausea and vomiting.   Genitourinary:  Negative for dysuria and hematuria.   Musculoskeletal:  Positive for arthralgias, back pain and gait problem. Negative for neck pain and neck stiffness.   Skin:  Negative for rash and wound.   Neurological:  Positive for weakness (generalized). Negative for seizures, syncope, numbness and headaches.   Psychiatric/Behavioral:  Negative for confusion.    Objective:     Vital Signs (Most Recent):  Temp: 97.9 °F (36.6 °C) (22 1440)  Pulse: 72 (22 1840)  Resp: 20  (03/30/22 1440)  BP: (!) 111/57 (03/30/22 1840)  SpO2: 96 % (03/30/22 1840)   Vital Signs (24h Range):  Temp:  [96.5 °F (35.8 °C)-97.9 °F (36.6 °C)] 97.9 °F (36.6 °C)  Pulse:  [72-76] 72  Resp:  [18-20] 20  SpO2:  [96 %-99 %] 96 %  BP: (104-114)/(51-58) 111/57     Weight: 107.7 kg (237 lb 7 oz)  Body mass index is 37.19 kg/m².    Physical Exam  Constitutional:       General: He is not in acute distress.     Appearance: He is obese. He is not toxic-appearing or diaphoretic.   HENT:      Head: Normocephalic and atraumatic.      Nose: Nose normal.   Eyes:      General: Scleral icterus present.      Extraocular Movements: Extraocular movements intact.      Pupils: Pupils are equal, round, and reactive to light.   Cardiovascular:      Rate and Rhythm: Normal rate and regular rhythm.   Pulmonary:      Effort: Pulmonary effort is normal. No respiratory distress.      Breath sounds: Rales present. No wheezing.   Abdominal:      General: Abdomen is flat. There is distension.      Palpations: Abdomen is soft.      Tenderness: There is no abdominal tenderness. There is no guarding.   Musculoskeletal:         General: Normal range of motion.      Cervical back: Normal range of motion and neck supple. No rigidity.      Right lower leg: Edema present.      Left lower leg: Edema present.   Skin:     General: Skin is warm and dry.      Coloration: Skin is jaundiced.   Neurological:      General: No focal deficit present.      Mental Status: He is alert and oriented to person, place, and time.      Cranial Nerves: No cranial nerve deficit.   Psychiatric:         Mood and Affect: Mood normal.         Behavior: Behavior normal.         CRANIAL NERVES     CN III, IV, VI   Pupils are equal, round, and reactive to light.     Significant Labs: All pertinent labs within the past 24 hours have been reviewed.  CBC:   Recent Labs   Lab 03/30/22  1703   WBC 9.02   HGB 10.9*   HCT 34.3*   PLT 65*     CMP:   Recent Labs   Lab 03/30/22  1703   NA  131*   K 5.4*   CL 94*   CO2 19*   *   BUN 29*   CREATININE 0.7   CALCIUM 9.3   PROT 5.7*   ALBUMIN 2.2*   BILITOT 8.9*   ALKPHOS 158*   AST 79*   ALT 21   ANIONGAP 18*   EGFRNONAA >60.0       Significant Imaging: I have reviewed all pertinent imaging results/findings within the past 24 hours.

## 2022-03-31 NOTE — HPI
This is a 77yo male with a past medical history of liver cirrhosis of unclear etiology, COPD, DM, testicular cancer, diffuse large cell lymphoma, and portal HTN who presents to the ED from hepatology clinic for anasarca. Patient reports progressively worsening abdominal and lower and upper extremity edema that has been worsening over several weeks. He reports associated shortness of breath, fatigue, dry non-productive cough, scrotal/penile edema. He was started on po lasix and aldactone days ago without any improvement and was seen for follow up today at hepatology clinic and was subsequently sent to ED for further eval and management. In the ED patient afebrile and hemodynamically stable saturating well on 3L NC. Denies fevers, chills, chest pain, nausea, vomiting, abdominal pain, confusion. Patient started on iv lasix and Washburn placed in ED. Patient admitted to the care of medicine for further evaluation and management.

## 2022-04-01 NOTE — PROGRESS NOTES
Jared Sosa - Telemetry [facility] Medicine  Progress Note    Patient Name: Andrei Longo  MRN: [id number]  Patient Class: IP- Inpatient   Admission Date: 3/30/2022  Length of Stay: 2 days  Attending Physician: Chris William MD  Primary Care Provider: Damian Yee MD        Subjective:     Principal Problem:Anasarca        HPI:  This is a 77yo male with a past medical history of liver cirrhosis of unclear etiology, COPD, DM, testicular cancer, diffuse large cell lymphoma, and portal HTN who presents to the ED from hepatology clinic for anasarca. Patient reports progressively worsening abdominal and lower and upper extremity edema that has been worsening over several weeks. He reports associated shortness of breath, fatigue, dry non-productive cough, scrotal/penile edema. He was started on po lasix and aldactone days ago without any improvement and was seen for follow up today at hepatology clinic and was subsequently sent to ED for further eval and management. In the ED patient afebrile and hemodynamically stable saturating well on 3L NC. Denies fevers, chills, chest pain, nausea, vomiting, abdominal pain, confusion. Patient started on iv lasix and [place] placed in ED. Patient admitted to the care of medicine for further evaluation and management.      Overview/Hospital Course:  Informed patient about his likely metastatic cancer of unknown primary. Mentioned possible paracentesis to help identify main source given peritoneal carcinomatosis. IR unable to perform LN bx inpatient. Continuing anasarca treatment with lasix. Patient received paracentesis 4/1, sent for cytology.      Interval History: No acute events overnight. Patient reports that today he feels about the same as yesterday, no improvement in his LE edema. He is curious when his MRI will be performed or when his paracentesis will be performed as well. Discussed being NPO for MRI but not necessarily for paracentesis.     Review of  Systems   Constitutional:  Negative for chills and fever.   HENT:  Negative for congestion and sore throat.    Eyes:  Negative for photophobia and visual disturbance.   Respiratory:  Negative for cough and shortness of breath.    Cardiovascular:  Positive for leg swelling. Negative for chest pain and palpitations.   Gastrointestinal:  Positive for abdominal distention. Negative for abdominal pain, blood in stool, constipation, diarrhea, nausea and vomiting.   Endocrine: Negative for cold intolerance and heat intolerance.   Genitourinary:  Negative for dysuria and hematuria.   Musculoskeletal:  Negative for arthralgias and myalgias.   Skin:  Positive for color change. Negative for rash and wound.   Allergic/Immunologic: Negative for environmental allergies and food allergies.   Neurological:  Negative for seizures and numbness.   Hematological:  Negative for adenopathy. Does not bruise/bleed easily.   Psychiatric/Behavioral:  Negative for hallucinations and suicidal ideas.    Objective:     Vital Signs (Most Recent):  Temp: 98.2 °F (36.8 °C) (04/01/22 1125)  Pulse: 71 (04/01/22 1125)  Resp: 20 (04/01/22 1125)  BP: (!) 98/52 (04/01/22 1125)  SpO2: 96 % (04/01/22 1125)   Vital Signs (24h Range):  Temp:  [97.6 °F (36.4 °C)-98.5 °F (36.9 °C)] 97.6 °F (36.4 °C)  Pulse:  [70-85] 72  Resp:  [18-20] 18  SpO2:  [95 %-99 %] 97 %  BP: ()/(51-68) 95/52     Weight: 107.5 kg (237 lb)  Body mass index is 37.12 kg/m².    Intake/Output Summary (Last 24 hours) at 3/31/2022 1635  Last data filed at 3/31/2022 1100  Gross per 24 hour   Intake --   Output 1850 ml   Net -1850 ml      Physical Exam  Constitutional:       Appearance: He is well-developed.   HENT:      Head: Normocephalic and atraumatic.   Eyes:      General: Scleral icterus present.      Conjunctiva/sclera: Conjunctivae normal.      Pupils: Pupils are equal, round, and reactive to light.   Neck:      Thyroid: No thyromegaly.      Vascular: No JVD.   Cardiovascular:       Rate and Rhythm: Normal rate and regular rhythm.      Heart sounds: Normal heart sounds. No murmur heard.    No friction rub. No gallop.   Pulmonary:      Effort: Pulmonary effort is normal. No respiratory distress.      Breath sounds: Normal breath sounds. No wheezing or rales.   Abdominal:      General: Bowel sounds are normal. There is distension.      Palpations: Abdomen is soft. There is no mass.      Tenderness: There is no abdominal tenderness. There is no guarding or rebound.      Hernia: No hernia is present.   Musculoskeletal:         General: No deformity. Normal range of motion.      Cervical back: Normal range of motion and neck supple.      Right lower leg: Edema present.      Left lower leg: Edema present.   Skin:     General: Skin is warm and dry.      Coloration: Skin is jaundiced.   Neurological:      Mental Status: He is alert and oriented to person, place, and time.      Cranial Nerves: No cranial nerve deficit.   Psychiatric:         Behavior: Behavior normal.       Significant Labs: All pertinent labs within the past 24 hours have been reviewed.  CBC:   Recent Labs   Lab 03/30/22  1703 03/31/22  0527   WBC 9.02 7.23   HGB 10.9* 9.6*   HCT 34.3* 29.8*   PLT 65* 54*     CMP:   Recent Labs   Lab 03/30/22  1703 03/31/22  0527   * 129*   K 5.4* 4.7   CL 94* 94*   CO2 19* 21*   * 100   BUN 29* 32*   CREATININE 0.7 0.8   CALCIUM 9.3 8.7   PROT 5.7* 4.9*   ALBUMIN 2.2* 2.0*   BILITOT 8.9* 7.9*   ALKPHOS 158* 140*   AST 79* 62*   ALT 21 22   ANIONGAP 18* 14   EGFRNONAA >60.0 >60.0       Significant Imaging: I have reviewed all pertinent imaging results/findings within the past 24 hours.      Assessment/Plan:      * Anasarca  Ssecondary to decompensated liver cirrhosis. TTE relatively unremarkable, CVP normal. Patients ascites may be malignant in origin and paracentesis should assist in diagnosis.   - started on lasix 80mg iv BID without any substantial improvement in his UOP, BUN  increasing suggesting pre-renal impending PIOTR. Will hold on further diuresis today.   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  he is not on home oxygen. Supplemental oxygen was provided and noted-  .   Signs/symptoms of respiratory failure include- hypoxia and dyspnea. Contributing diagnoses includes - Anasarca Labs and images were reviewed. Patient Has not had a recent ABG. Will treat underlying causes and adjust management of respiratory failure as follows- Diuresis and further management see anasarca above    Decompensated hepatic cirrhosis  MELD-Na score: 22 at 4/1/2022  7:13 AM  MELD score: 18 at 4/1/2022  7:13 AM  Calculated from:  Serum Creatinine: 0.8 mg/dL (Using min of 1 mg/dL) at 4/1/2022  7:13 AM  Serum Sodium: 131 mmol/L at 4/1/2022  7:13 AM  Total Bilirubin: 8.8 mg/dL at 4/1/2022  7:13 AM  INR(ratio): 1.3 at 4/1/2022  7:13 AM  Age: 76 years  Not a transplant candidate 2/2 multiple mets from unknown primary  - Previously diueresing but now ordering paracentesis to assist with volume removal given BUN increase  - hepatology consulted ; appreciate recs  - for further management see Anasarca above      Diabetes mellitus  - SSI  - hypoglycemic protocol        VTE Risk Mitigation (From admission, onward)         Ordered     IP VTE HIGH RISK PATIENT  Once         03/30/22 2016     Place sequential compression  device  Until discontinued         03/30/22 2016                Discharge Planning   MILENA: 4/4/2022     Code Status: Full Code   Is the patient medically ready for discharge?:     Reason for patient still in hospital (select all that apply): Patient trending condition                     Chris William MD  Department of Hospital Medicine   [facility] - Telemetry Stepdown

## 2022-04-01 NOTE — PLAN OF CARE
Jared Sosa - Telemetry Stepdown  Initial Discharge Assessment       Primary Care Provider: Damian Yee MD    Admission Diagnosis: Anasarca [R60.1]  CHF (congestive heart failure) [I50.9]  Chest pain [R07.9]  Cirrhosis of liver with ascites, unspecified hepatic cirrhosis type [K74.60, R18.8]  Acute liver failure without hepatic coma [K72.00]    Admission Date: 3/30/2022  Expected Discharge Date: 4/4/2022    Discharge Barriers Identified: None    Payor: MEDICARE / Plan: MEDICARE PART A & B / Product Type: Government /     Extended Emergency Contact Information  Primary Emergency Contact: Emily Longo  Address: [address] [place] [address] [place] [address] [facility]  Mobile Phone: [phone]  Relation: Spouse    Discharge Plan A: Skilled Nursing Facility  Discharge Plan B: Home with family, Home Health      [id number] - [place], [place] - [address] [place] &  [address]  [place] [address]  Phone: [phone] Fax: [phone]    [id number] - [place] - [address] [address]  [address]  [place] [address]  Phone: [phone] Fax: [phone]    [facility] [place] - [place] - [address]  [address]  [place] [address]  Phone: [phone] Fax: [phone]      Initial Assessment (most recent)     Adult Discharge Assessment - 04/01/22 1433        Discharge Assessment    Assessment Type Discharge Planning Assessment     Confirmed/corrected address, phone number and insurance Yes     Confirmed Demographics Correct on Facesheet     Source of Information family     Reason For Admission Anasarca     Lives With spouse     Do you expect to return to your current living situation? Yes   vs SNF or rehab pending patient decision    Walking or Climbing Stairs Difficulty --   Patient reportedly independent with ADLs prior to hospitalization, per family    Dressing/Bathing Difficulty --    Patient reportedly independent with ADLs prior to hospitalization, per family    Home Accessibility stairs to enter home     Number of Stairs, Main Entrance five     Stair Railings, Main Entrance railings safe and in good condition     Home Layout Able to live on 1st floor     Equipment Currently Used at Home walker, rolling     Do you currently have service(s) that help you manage your care at home? No     Do you take prescription medications? Yes     Do you have prescription coverage? Yes     Do you have any problems affording any of your prescribed medications? Yes     If yes, what medications? Trulicity, per family report     Who is going to help you get home at discharge? Family vs P[facility] if patient is agreeable to rehab placement     How do you get to doctors appointments? family or friend will provide     Are you on dialysis? No     Do you take coumadin? No     Discharge Plan A Skilled Nursing Facility     Discharge Plan B Home with family;Home Health     DME Needed Upon Discharge  --   TBD    Discharge Plan discussed with: Spouse/sig other;Adult children     Discharge Barriers Identified None               Tori Soares LMSW  [facility]  Ext. [phone]

## 2022-04-01 NOTE — PLAN OF CARE
Received patient at change of shift. Pt aaox4. Vss stable. Pt on 2L nc. Desat while asleep. Pt co 8/10 back/ shoulder pain. Prn po dilaudid given. Pt kept npo for MRI. Paracentesis pending. AM poct 101. Rn will continue to monitor. Family at bedside. Call bell in reach.     Problem: Infection  Goal: Absence of Infection Signs and Symptoms  Outcome: Ongoing, Progressing     Problem: Adult Inpatient Plan of Care  Goal: Plan of Care Review  Outcome: Ongoing, Progressing  Goal: Patient-Specific Goal (Individualized)  Outcome: Ongoing, Progressing  Goal: Absence of Hospital-Acquired Illness or Injury  Outcome: Ongoing, Progressing  Goal: Optimal Comfort and Wellbeing  Outcome: Ongoing, Progressing  Goal: Readiness for Transition of Care  Outcome: Ongoing, Progressing     Problem: Diabetes Comorbidity  Goal: Blood Glucose Level Within Targeted Range  Outcome: Ongoing, Progressing     Problem: Fall Injury Risk  Goal: Absence of Fall and Fall-Related Injury  Outcome: Ongoing, Progressing     Problem: Skin Injury Risk Increased  Goal: Skin Health and Integrity  Outcome: Ongoing, Progressing

## 2022-04-01 NOTE — PLAN OF CARE
Patient AAOx3, no distress noted, respirations even and unlabored, vss, will continue to monitor. Acceptance of education, consents signed, H/P done. Labs reviewed. Patient in MPU [place] 3 for paracentesis. No sedation to be administered; local anesthetic only.

## 2022-04-01 NOTE — PT/OT/SLP PROGRESS
Occupational Therapy      Patient Name:  Andrei Longo   MRN:  [id number]    Patient not seen today secondary to  (patient politely declined despite education and encouragement). Will follow-up on next available date as medically appropriate.    4/1/2022

## 2022-04-01 NOTE — PHYSICIAN QUERY
PT Name: Andrei Longo  MR #: [id number]     Documentation Clarification      CDS: John LAI,RN        Contact information:[email or web address]     This form is a permanent document in the medical record.     Query Date: April 1, 2022    By submitting this query, we are merely seeking further clarification of documentation. Please utilize your independent clinical judgment when addressing the question(s) below.    The Medical Record reflects the following:    Clinical Findings Location in Medical Records   Acute hypoxemic respiratory failure  Patient with Hypoxic Respiratory failure which is Acute.  he is not on home oxygen. Supplemental oxygen was provided and noted- Signs/symptoms of respiratory failure include- hypoxia and dyspnea. Contributing diagnoses includes - Anasarca Labs and images were reviewed. Patient Has not had a recent ABG. Will treat underlying causes and adjust management of respiratory failure as follows- Diuresis and further management see anasarca above.  Positive for cough and shortness of breath  Positive for leg swelling.  Vital Signs (24h Range):  Temp: 96.5F--->97.9 F  Pulse: 72--->76  Resp: 18--->20  SpO2: 96%--->99%  BP: 104--->114/51--->58      3/31 O2 Device Oxygen: room air--->2 L nasal cannula--->room air                        3/30 Hospital Medicine H&P                         3/30--->4/1 Default Flowsheet Data     FINDINGS: The cardiomediastinal silhouette is prominent, similar to the previous exam noting magnification by technique..  There is no pleural effusion.  The trachea is midline.  The lungs are symmetrically expanded bilaterally with left basilar subsegmental atelectasis or scarring.  There are long appearing structures projected over the lateral aspect of the left midlung zone, may be external to the patient given their configuration.No large focal consolidation seen. There is no pneumothorax.       3/30 CXR                                                                                 Provider, please clarify the diagnosis of _acute hypoxemic respiratory failure________________:      [  x ]  Acute hypoxemic respiratory failure is confirmed and additional clinical support/decision-making indicators for the diagnosis include (please specify):_patient would drop to O2 less than 91% on room air on admission prior to any intervention_______________   [   ] Acute hypoxemic respiratory failure is not confirmed and/or it has been ruled out   [   ] Acute hypoxemic respiratory failure is not confirmed and/or it has been ruled out, other diagnosis ruled in (please specify):_______________   [   ] Other clarification (please specify): ___________________   [  ] Clinically undetermined

## 2022-04-01 NOTE — ASSESSMENT & PLAN NOTE
MELD-Na score: 22 at 4/1/2022  7:13 AM  MELD score: 18 at 4/1/2022  7:13 AM  Calculated from:  Serum Creatinine: 0.8 mg/dL (Using min of 1 mg/dL) at 4/1/2022  7:13 AM  Serum Sodium: 131 mmol/L at 4/1/2022  7:13 AM  Total Bilirubin: 8.8 mg/dL at 4/1/2022  7:13 AM  INR(ratio): 1.3 at 4/1/2022  7:13 AM  Age: 76 years  Not a transplant candidate 2/2 multiple mets from unknown primary  - Previously diueresing but now ordering paracentesis to assist with volume removal given BUN increase  - hepatology consulted ; appreciate recs  - for further management see Anasarca above

## 2022-04-01 NOTE — PROCEDURES
Radiology Post-Procedure Note    Pre Op Diagnosis: Ascites  Post Op Diagnosis: Same    Procedure: Ultrasound Guided Paracentesis    Procedure performed by: Rosamaria GODFREY, Amy     Written Informed Consent Obtained: Yes  Specimen Removed: YES cloudy yellow  Estimated Blood Loss: Minimal    Findings:   Successful paracentesis.  Albumin administered PRN per protocol.    Patient tolerated procedure well.    Amy Blank, APRN, FNP  Interventional Radiology  [phone] clinic

## 2022-04-01 NOTE — ASSESSMENT & PLAN NOTE
Ssecondary to decompensated liver cirrhosis. TTE relatively unremarkable, CVP normal. Patients ascites may be malignant in origin and paracentesis should assist in diagnosis.   - started on lasix 80mg iv BID without any substantial improvement in his UOP, BUN increasing suggesting pre-renal impending PIOTR. Will hold on further diuresis today.   - low sodium fluid restricted diet  - strict I/Os and daily weights  - Diagnostic paracentesis ordered  - monitor tele  - Hepatology consulted ; appreciate recs  - PT/OT consulted  ;  Appreciate recs  - further management pending clinical course and follow up

## 2022-04-01 NOTE — PLAN OF CARE
Paracentesis done. Removed 2400 ml. No Albumin 25%. Patient AAOx3, no distress noted, respirations even and unlabored, oxygen saturation >95% on 2L oxygen via nasal cannula. VSS. Left abdominal site clean, dry, and intact; no bleeding or hematoma noted. Patient to be transferred back to bed 8078 via patient transporter. Patient stable for transport. Report called to SARTHAK Hess.

## 2022-04-01 NOTE — H&P
Inpatient Radiology Pre-procedure Note    History of Present Illness:  Andrei Longo is a 76 y.o. male who presents for ultrasound guided paracentesis.  Admission H&P reviewed.  Past Medical History:   Diagnosis Date    Anemia     Bleeding esophageal varices 8/9/2018    COPD (chronic obstructive pulmonary disease)     Diabetes mellitus     Encounter for blood transfusion     Portal hypertensive gastropathy     Portal hypertensive gastropathy     Testicular cancer     testicular     Past Surgical History:   Procedure Laterality Date    COLONOSCOPY N/A 2/22/2022    Procedure: COLONOSCOPY;  Surgeon: Pollo Wilkerson MD;  Location: [facility];  Service: Endoscopy;  Laterality: N/A;    ESOPHAGOGASTRODUODENOSCOPY N/A 8/8/2018    Procedure: EGD (ESOPHAGOGASTRODUODENOSCOPY);  Surgeon: Pollo Wilkerson MD;  Location: [facility];  Service: Endoscopy;  Laterality: N/A;    ESOPHAGOGASTRODUODENOSCOPY N/A 9/6/2018    Procedure: ESOPHAGOGASTRODUODENOSCOPY (EGD);  Surgeon: Pollo Wilkerson MD;  Location: [facility];  Service: Endoscopy;  Laterality: N/A;    ESOPHAGOGASTRODUODENOSCOPY N/A 12/31/2018    Procedure: ESOPHAGOGASTRODUODENOSCOPY (EGD);  Surgeon: Pollo Wilkerson MD;  Location: [facility];  Service: Endoscopy;  Laterality: N/A;    ESOPHAGOGASTRODUODENOSCOPY N/A 12/17/2019    Procedure: ESOPHAGOGASTRODUODENOSCOPY (EGD);  Surgeon: Pollo Wilkerson MD;  Location: [facility];  Service: Endoscopy;  Laterality: N/A;    ESOPHAGOGASTRODUODENOSCOPY N/A 12/14/2020    Procedure: EGD (ESOPHAGOGASTRODUODENOSCOPY);  Surgeon: Pollo Wilkerson MD;  Location: [facility];  Service: Endoscopy;  Laterality: N/A;  ESOPH VARICES/COPD    ESOPHAGOGASTRODUODENOSCOPY W/ BANDING      TESTICLE SURGERY      testicular surgery         Review of Systems:   As documented in primary team H&P    Home Meds:   Prior to Admission medications    Medication Sig Start Date End Date Taking? Authorizing Provider    betamethasone valerate 0.1% (VALISONE) 0.1 % Crea Apply sparingly twice a day as needed for skin irritation 22  Yes Historical Provider   BIOFREEZE, MENTHOL, TOP Apply 1 application topically as needed (Shoulder pain).   Yes Historical Provider   carvediloL (COREG) 3.125 MG tablet Take 3.125 mg by mouth 2 (two) times daily with meals.   Yes Historical Provider   diphenoxylate-atropine 2.5-0.025 mg (LOMOTIL) 2.5-0.025 mg per tablet Take 1 tablet by mouth 2 (two) times daily as needed for Diarrhea.   Yes Historical Provider   furosemide (LASIX) 40 MG tablet Take 40 mg by mouth every morning. 22  Yes Historical Provider   HYDROmorphone (DILAUDID) 2 MG tablet Take 1 mg by mouth 2 (two) times daily as needed. 3/29/22  Yes Historical Provider   spironolactone (ALDACTONE) 25 MG tablet SMARTSI Tablet(s) By Mouth Every Evening 3/23/22  Yes Historical Provider   TRULICITY 0.75 mg/0.5 mL pen injector Inject 0.75 mg into the skin every 7 days. 3/23/22  Yes Historical Provider     Scheduled Meds:   Continuous Infusions:   PRN Meds:acetaminophen, albuterol-ipratropium, aluminum-magnesium hydroxide-simethicone, glucagon (human recombinant), glucose, glucose, HYDROmorphone, insulin aspart U-100, melatonin, naloxone, ondansetron, sodium chloride 0.9%  Anticoagulants/Antiplatelets: no anticoagulation    Allergies: Review of patient's allergies indicates:  No Known Allergies  Sedation Hx: have not been any systemic reactions    Vitals:  Temp: 98.2 °F (36.8 °C) (22)  Pulse: 71 (22)  Resp: 20 (22)  BP: (!) 98/52 (22)  SpO2: 96 % (22)     Physical Exam:  ASA: 3  Mallampati: n/a    General: no acute distress  Mental Status: alert and oriented to person, place and time  HEENT: normocephalic, atraumatic  Chest: unlabored breathing  Heart: regular heart rate  Abdomen: distended  Extremity: moves all extremities    Plan: ultrasound guided paracentesis  Sedation Plan:  local    CHRISTIAN Salmon, FNP  Interventional Radiology  [phone] clinic

## 2022-04-01 NOTE — PT/OT/SLP PROGRESS
Physical Therapy      Patient Name:  Andrei Longo   MRN:  [id number]    Patient not seen today secondary to [place] for paracentesis. Will follow-up 4/4/22.

## 2022-04-01 NOTE — PLAN OF CARE
Problem: Infection  Goal: Absence of Infection Signs and Symptoms  Outcome: Ongoing, Progressing     Problem: Adult Inpatient Plan of Care  Goal: Plan of Care Review  Outcome: Ongoing, Progressing  Goal: Patient-Specific Goal (Individualized)  Outcome: Ongoing, Progressing  Goal: Absence of Hospital-Acquired Illness or Injury  Outcome: Ongoing, Progressing  Goal: Optimal Comfort and Wellbeing  Outcome: Ongoing, Progressing  Goal: Readiness for Transition of Care  Outcome: Ongoing, Progressing     Problem: Diabetes Comorbidity  Goal: Blood Glucose Level Within Targeted Range  Outcome: Ongoing, Progressing     Problem: Fall Injury Risk  Goal: Absence of Fall and Fall-Related Injury  Outcome: Ongoing, Progressing     Problem: Skin Injury Risk Increased  Goal: Skin Health and Integrity  Outcome: Ongoing, Progressing    END OF SHIFT NOTES:  PATIENT RESTED, MOST OF THE NIGHT. NO DISTRESS NOTED THIS AM. VITALS STABLE. PATIENT PLACED ON 2L NC LAST NIGHT WHILE SLEEPING BECAUSE O2 SATS DROPPED TO 92. UPON REASSESSING PT O2 SATS WENT UP TO 96%. PT A& O X 4. PATIENT ENCOURAGED TO TURN SELF IN BED BUT ASSISTED IF NEEDED. BED IN LOW POSITION & CALL LIGHT WITH IN REACH. FAMILY MEMBER AT BS.

## 2022-04-02 NOTE — PLAN OF CARE
1020  CM was informed by Dr. Guerra that the patient is medically stable to discharge home with home hospice today. Patient would like to return to have the pleur-x catheter placed. Awaiting home hospice orders.     1115  Patient awake & alert sitting on edge of bed with spouse, Emily Longo ([phone]), at the bedside when CM rounded. Pt & spouse in agreement with plan to discharge home with home hospice today. Spouse stated that they would like to receive home hospice services from [facility]. Spouse to provide transportation at time of discharge.     1225  Referral manually faxed to [facility] (f) [phone]. CM paged on-call nurse with [facility] (p) [phone]. Awaiting response.     1327  Additional page sent to [facility] requesting a return call. Awaiting response.     1340  CM was informed by Tere w/[facility] that they will not be able to accepted the patient due to staffing. CM informed spouse of above. Spouse did not have an additional preference. Voicemail message left for Michelle Escobedo ([phone]) w/[facility] informing of referral sent via [employer]. Awaiting response.     1350  Additional home hospice referral sent to [facility] (p) [phone] as requested by the spouse.    1430  CM was informed by Ashu w/[facility] that the referral was not received & requested that the referral be emailed to ([email or web address]).     CM was informed by Ashok (p) [phone] w/[facility] that [facility] could accept the patient today with transition to hospice services with [facility] after the pleur-x catheter is placed. CM informed Dr. Guerra of above & requested the HH orders be entered.     1500  Referral sent to [facility] via [facility]. CM informed the patient's spouse, Emily Longo, & nurse Yajaira ([phone]) via phone of discharge  status. Spouse in agreement with the discharge plan & daughter at the bedside to provide transportation. Spouse stated that they were told that [facility] accept the patient. Additional referral sent to [facility] via [facility] as requested.

## 2022-04-02 NOTE — ASSESSMENT & PLAN NOTE
Unknown primary, patient has hx of DLBCL and testicular cancer. Could be from recurrent lymphoma vs metastatic prostate CA. CT chest/abd/pelv demonstrating widespread lymphadenopathy and lobular prostate as well as hepatic masses.  MRI abdomen ordered, showed new hepatic masses, left adrenal mass, and lymphadenopathy within the lower thorax and abdomen  - s/p paracentesis on 4/1 with cytology  - psa pending  - IR unable to perform LN bx inpatient, patient and family not wanting to proceed with tissue diagnosis or treatment in any event. Electing for hospice instead which will happen once they return home and are seen by home health and transitioned.   - Provided patient with 30 tabs of 2mg dilaudid for pain given his terminal diagnosis. Rest of his care will be assumed by hospice agency.

## 2022-04-02 NOTE — ASSESSMENT & PLAN NOTE
Would not recommend DM management due to patient and family electing for hospice and his BG values being relatively stable during admission.

## 2022-04-02 NOTE — ASSESSMENT & PLAN NOTE
MELD-Na score: 22 at 4/2/2022  5:26 AM  MELD score: 19 at 4/2/2022  5:26 AM  Calculated from:  Serum Creatinine: 1.1 mg/dL at 4/2/2022  5:26 AM  Serum Sodium: 132 mmol/L at 4/2/2022  5:26 AM  Total Bilirubin: 8.9 mg/dL at 4/2/2022  5:26 AM  INR(ratio): 1.4 at 4/2/2022  5:26 AM  Age: 76 years  Not a transplant candidate 2/2 multiple mets from unknown primary  - Patient and family may pursue pleurX placement outpatient for further assistance in ascites control

## 2022-04-02 NOTE — PLAN OF CARE
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 [C77.2]  Yes    Decompensated hepatic cirrhosis [K72.90, K74.60]  Yes    Acute hypoxemic respiratory failure [J96.01]  Yes    History of testicular cancer [Z85.47]  Not Applicable    Diabetes mellitus [E11.9]  Yes      Resolved Hospital Problems    Diagnosis Date Resolved POA    Diffuse large cell lymphoma in remission [C83.30] 03/31/2022 Yes       Hospice Qualifying Diagnoses: Cancer, presumably recurrent lymphoma       Patient has a life expectancy < 6 months due to:  1) Primary Hospice Diagnosis:  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    Code Status: DNR    Allergies: Review of patient's allergies indicates:  No Known Allergies    Diet: 2g Sodium, 1500cc fluid restriction     Activities: As tolerated    Nursing: Per Hospice Routine.     Oxygen: 2L O2    Other Miscellaneous Care: Patient will need pleurX catheter placement outpatient assuming his ascites reoccurs.     Medications:        Medication List      CONTINUE taking these medications    HYDROmorphone 2 MG tablet  Commonly known as: DILAUDID  Take 1 mg by mouth 2 (two) times daily as needed.        STOP taking these medications    betamethasone valerate 0.1% 0.1 % Crea  Commonly known as: VALISONE     BIOFREEZE (MENTHOL) TOP     carvediloL 3.125 MG tablet  Commonly known as: COREG     diphenoxylate-atropine 2.5-0.025 mg 2.5-0.025 mg per tablet  Commonly known as: LOMOTIL     furosemide 40 MG tablet  Commonly known as: LASIX      spironolactone 25 MG tablet  Commonly known as: ALDACTONE     TRULICITY 0.75 mg/0.5 mL pen injector  Generic drug: dulaglutide              Future Orders:  Hospice Medical Director may dictate new orders for comfortable care measures & sign death certificate.        _________________________________  Chris William MD  04/02/2022

## 2022-04-02 NOTE — ASSESSMENT & PLAN NOTE
Ssecondary to decompensated liver cirrhosis and possibly from intraabdominal lymphadenopathy obstructing venous return. TTE relatively unremarkable, CVP normal. Patients ascites may be malignant in origin. He received para on 4/1 and had 2.4L removed.   - Patient and wife are curious about pleurx placement but did not want to wait until Monday to have it placed. They stated they would discuss it with whatever hospice agency they agree to.   - Attempted to diurese patient with lasix 80mg iv BID but there was no substantial improvement in his UOP and BUN increasing suggesting pre-renal impending PIOTR.   - low sodium fluid restricted diet  - Patient and family elected for hospice and no further diuresis recommended given lymphadenopathy may be impacting venous return and in-hospital diurietics only caused pre-renal azotemia.

## 2022-04-02 NOTE — ACP (ADVANCE CARE PLANNING)
Advance Care Planning     Date: 04/02/2022    Today a meeting took place: bedside    Patient Participation: Patient is able to participate     Attendees (Name and  Relationship to patient): Legal surrogate decision-maker: Ailyn Longo, wife     Staff attendees (Name and  Role): Dr. Chris William    ACP Conversation (General): Understanding of current condition metastatic cancer of unknown primary in the setting of cirrhosis and volume overload    Code Status: DNR; status confirmed/order placed in chart     ACP Documents: Other Documents (specify): Patient reports having a living will drawn up in 2007, but was not intesrested in updating anything aside from code status today.     Goals of care: The patient and family endorses that what is most important right now is to focus on spending time at home, avoiding the hospital, symptom/pain control and comfort and QOL     Accordingly, we have decided that the best plan to meet the patient's goals includes enrolling in hospice care      Recommendations/  Follow-up tasks: follow-up with home health w/ transition to hospice    Length of ACP   conversation in minutes: 30 minutes

## 2022-04-02 NOTE — NURSING
Pt discharged home. Discharge instructions provided to patient's wife at bedside. Pt's wife verbalized understanding. Pt in no distress. PIV d/c. Pt left with belongings. Pt left via wheelchair with nurse in monitor.

## 2022-04-02 NOTE — NURSING
PATIENT C/O HAVING A LITTLE BACK PAIN THIS AM. PRN PAIN MEDICATIONS GIVEN. PATIENT AMBULATED W/ ASSIST TO BATHROOM TO HAVE A BM THEN BACK TO BED. PATIENT'S FAMILY MEMBER GIVEN TOWELS NEW GOWN & WIPES TO BE CLEANED UP A LITTLE THIS AM.

## 2022-04-02 NOTE — DISCHARGE SUMMARY
Jared Sosa - Telemetry [facility] Medicine  Discharge Summary      Patient Name: Andrei Longo  MRN: [id number]  Patient Class: IP- Inpatient  Admission Date: 3/30/2022  Hospital Length of Stay: 3 days  Discharge Date and Time:  04/02/2022 3:36 PM  Attending Physician: Chris William MD   Discharging Provider: Chris William MD  Primary Care Provider: Damian Yee MD  [facility] Medicine Team: [facility] MED  Chris William MD    HPI:   This is a 77yo male with a past medical history of liver cirrhosis of unclear etiology, COPD, DM, testicular cancer, diffuse large cell lymphoma, and portal HTN who presents to the ED from hepatology clinic for anasarca. Patient reports progressively worsening abdominal and lower and upper extremity edema that has been worsening over several weeks. He reports associated shortness of breath, fatigue, dry non-productive cough, scrotal/penile edema. He was started on po lasix and aldactone days ago without any improvement and was seen for follow up today at hepatology clinic and was subsequently sent to ED for further eval and management. In the ED patient afebrile and hemodynamically stable saturating well on 3L NC. Denies fevers, chills, chest pain, nausea, vomiting, abdominal pain, confusion. Patient started on iv lasix and Washburn placed in ED. Patient admitted to the care of medicine for further evaluation and management.      * No surgery found *      Hospital Course:   Informed patient about his likely metastatic cancer of unknown primary. Mentioned possible paracentesis to help identify main source given peritoneal carcinomatosis. IR unable to perform LN bx inpatient. Continuing anasarca treatment with lasix. Patient received paracentesis 4/1, sent for cytology. MRI was performed 4/2 that showed new hepatic masses, left adrenal mass, and lymphadenopathy within the lower thorax and abdomen. Patient and wife discussed that he does not want chemotherapy under any  circumstances. He states that he wants to go home and does not want to proceed with tissue diagnosis in the future. Patient to be set up with  with transition to hospice once he arrives home in [place].        Goals of Care Treatment Preferences:  Code Status: DNR      Consults:   Consults (From admission, onward)        Status Ordering Provider     Inpatient consult to Interventional Radiology  Once        Provider:  (Not yet assigned)    Completed TASHA VIZCARRA     Inpatient consult to Hepatology  Once        Provider:  (Not yet assigned)    Completed POLA PADILLA          * Malignant neoplasm metastatic to intra-abdominal lymph node  Unknown primary, patient has hx of DLBCL and testicular cancer. Could be from recurrent lymphoma vs metastatic prostate CA. CT chest/abd/pelv demonstrating widespread lymphadenopathy and lobular prostate as well as hepatic masses.  MRI abdomen ordered, showed new hepatic masses, left adrenal mass, and lymphadenopathy within the lower thorax and abdomen  - s/p paracentesis on 4/1 with cytology  - psa pending  - IR unable to perform LN bx inpatient, patient and family not wanting to proceed with tissue diagnosis or treatment in any event. Electing for hospice instead which will happen once they return home and are seen by home health and transitioned.   - Provided patient with 30 tabs of 2mg dilaudid for pain given his terminal diagnosis. Rest of his care will be assumed by hospice agency.       Decompensated hepatic cirrhosis  MELD-Na score: 22 at 4/2/2022  5:26 AM  MELD score: 19 at 4/2/2022  5:26 AM  Calculated from:  Serum Creatinine: 1.1 mg/dL at 4/2/2022  5:26 AM  Serum Sodium: 132 mmol/L at 4/2/2022  5:26 AM  Total Bilirubin: 8.9 mg/dL at 4/2/2022  5:26 AM  INR(ratio): 1.4 at 4/2/2022  5:26 AM  Age: 76 years  Not a transplant candidate 2/2 multiple mets from unknown primary  - Patient and family may pursue pleurX placement outpatient for further assistance in ascites  control        Anasarca  Ssecondary to decompensated liver cirrhosis and possibly from intraabdominal lymphadenopathy obstructing venous return. TTE relatively unremarkable, CVP normal. Patients ascites may be malignant in origin. He received para on 4/1 and had 2.4L removed.   - Patient and wife are curious about pleurx placement but did not want to wait until Monday to have it placed. They stated they would discuss it with whatever hospice agency they agree to.   - Attempted to diurese patient with lasix 80mg iv BID but there was no substantial improvement in his UOP and BUN increasing suggesting pre-renal impending PIOTR.   - low sodium fluid restricted diet  - Patient and family elected for hospice and no further diuresis recommended given lymphadenopathy may be impacting venous return and in-hospital diurietics only caused pre-renal azotemia.       Diabetes mellitus  Would not recommend DM management due to patient and family electing for hospice and his BG values being relatively stable during admission.       Final Active Diagnoses:    Diagnosis Date Noted POA    PRINCIPAL PROBLEM:  Anasarca [R60.1] 03/30/2022 Yes    Malignant neoplasm metastatic to intra-abdominal lymph node [C77.2] 03/31/2022 Yes    Decompensated hepatic cirrhosis [K72.90, K74.60] 03/30/2022 Yes    Acute hypoxemic respiratory failure [J96.01] 03/30/2022 Yes    History of testicular cancer [Z85.47] 08/24/2018 Not Applicable    Diabetes mellitus [E11.9] 08/08/2018 Yes      Problems Resolved During this Admission:    Diagnosis Date Noted Date Resolved POA    Diffuse large cell lymphoma in remission [C83.30] 08/24/2018 03/31/2022 Yes       Discharged Condition: poor    Disposition: Hospice/Home    Follow Up:   Follow-up Information     [facility] Follow up on 4/3/2022.    Specialty: Home Health Services  Why: will provide home health services  [facility] information:  [address] [facility]  [address]  [place]  [address]  [phone]             [facility] Follow up.    Why: will provide home hospice services after pleur-x catheter is placed  Contact information:  [facility]  [phone]                       Patient Instructions:   No discharge procedures on file.    Significant Diagnostic Studies: Labs:   CMP   Recent Labs   Lab 04/01/22  0713 04/02/22  0526   * 132*   K 4.8 4.7   CL 95 95   CO2 19* 22*   GLU 95 107   BUN 43* 55*   CREATININE 0.8 1.1   CALCIUM 8.9 8.6*   PROT 5.2* 5.2*   ALBUMIN 2.1* 2.0*   BILITOT 8.8* 8.9*   ALKPHOS 148* 153*   AST 71* 75*   ALT 22 23   ANIONGAP 17* 15   ESTGFRAFRICA >60.0 >60.0   EGFRNONAA >60.0 >60.0    and CBC   Recent Labs   Lab 04/01/22  0927 04/02/22  0526   WBC 9.95 9.28   HGB 10.5* 10.4*   HCT 32.1* 32.2*   PLT 60* 62*     Radiology:     MRI 4/1/22:  1. Compared to prior MRI dated 02/05/2021, there is interval development of numerous new hepatic masses, left adrenal mass, and lymphadenopathy within the lower thorax and abdomen.  Favor sequela of patient's lymphoproliferative disease however metastasis from other primary is included in the differential.  Tissue sampling will be required for definitive diagnosis.  2. Cirrhotic liver morphology.  Hepatic lesions do not demonstrate arterial hyperenhancement.  No diagnostic evidence of HCC.  3. Sequela of portal venous hypertension with splenomegaly, small volume ascites, gastroesophageal varices, collateral vessels within the upper abdomen and recanalized umbilical vein.  4. Small right and trace left pleural effusions.  5. Cholelithiasis.     CT scan: CT ABDOMEN PELVIS WITH CONTRAST:   Results for orders placed or performed during the hospital encounter of 03/30/22   CT Abdomen Pelvis With Contrast    Narrative    EXAMINATION:  CT ABDOMEN PELVIS WITH CONTRAST; CT CHEST WITHOUT CONTRAST    CLINICAL HISTORY:  Hepatocellular carcinoma, assess treatment response;;  Prostate cancer, assess treatment response;    TECHNIQUE:  Low dose axial images were obtained of the chest without intravenous contrast.  Additional images were obtained of the abdomen pelvis following the IV administration 100 cc mL of Omnipaque 350.  Arterial, portal venous, and delayed phase images were acquired over the abdomen.  Sagittal and coronal reformats were provided.    COMPARISON:  MRI abdomen 02/05/2021, 07/12/2019.  CT abdomen pelvis 01/20/2021.    FINDINGS:  CT CHEST:    Evaluation is limited by extensive streak artifact due to the patient's arms overlying the field of view.    Base of the neck: The structures at the base of the neck are unremarkable.    Heart: No cardiomegaly or pericardial effusion. Multivessel coronary artery calcification.    Heidi/Mediastinum: Enlarged subcarinal lymph node measures 1.2 cm in short axis (series 2, image 52).  There are new enlarged bulky periesophageal lymph nodes in the lower posterior mediastinum to the right of midline.  Largest periesophageal lymph node measures 2.3 cm in short axis (series 2, image 79).  Adjacent enlarged periesophageal lymph node measures 1.5 cm in short axis (series 2, image 80).  Enlarged right lateral pericardial lymph node measures up to 1.2 cm in short axis (series 2, image 76).    Lungs/Airways: Central airways are patent.  The lungs are symmetrically expanded.  Small right pleural effusion with bibasilar atelectasis.  No pulmonary mass.    CT ABDOMEN PELVIS:    Evaluation is limited by extensive streak artifact due to the patient's arms overlying the field of view.    Liver: Cirrhotic liver morphology with irregular nodular contour and lobar redistribution.  Evaluation of the liver parenchyma is limited by extensive artifact related to the patient's arms overlying the field of view.  No focal arterially enhancing lesion identified.  On the portal venous phase, there are vague areas of hypoattenuation in the liver which are best  evaluated utilizing liver CT windows.  Vague region of hypoattenuation in the caudate lobe measures approximately 3.8 x 3.4 cm in axial dimension (venous phase series 3, image 25).  Additional vague region of hypoattenuation in the right hepatic lobe measures 2 cm (series 3, image 26).  Additional hypoattenuating lesion in the inferior right hepatic lobe measures approximately 2.9 x 2.5 x 3.5 cm (series 3, image 66 and coronal image 111).  Possible smaller hypoattenuating lesion in the liver adjacent to the gallbladder (series 3, image 54).  The portal, splenic, and superior mesenteric veins are patent.    Gallbladder: Cholelithiasis.    Bile Ducts: No evidence of dilated ducts.    Pancreas: No mass or peripancreatic fat stranding.    Spleen: Markedly enlarged measuring up to approximately 20 cm in maximal craniocaudal dimension.  Stable small 1.7 cm splenic dome lesion with imaging characteristics suggestive of hemangioma.  Additional lesion in the inferior spleen is more conspicuous on previous MRIs.  No new lesions.  There is a small splenule adjacent to the spleen.    Adrenals: There is a new mass in the left adrenal gland measuring approximately 2.5 cm in size (series 3, image 51).  Right adrenal gland is unremarkable.    Kidneys/ Ureters: Unremarkable.    Bladder: Urinary bladder is decompressed around a Washburn catheter with nondependent foci of air likely related to instrumentation.    Reproductive organs: Enlarged lobular ill-defined prostate, significantly larger when compared to prior CT dated 01/28/2021.  Prostate measures up to 9 cm in transverse width and protrudes into the base of the bladder.  The prostate gland demonstrates abnormal contours.    GI Tract/Mesentery: No small bowel obstruction.  There are gastroesophageal varices.  No convincing inflammatory changes identified in bowel allowing for the presence of diffuse mesenteric edema and moderate volume abdominopelvic ascites.    Peritoneal Space:  Moderate volume abdominopelvic ascites.  Diffuse mesenteric edema.  Somewhat nodular appearance of the peritoneum and mesentery noting a few small peritoneal nodules along the upper anterior abdomen (series 3, image 26).  Additional small peritoneal nodules are present elsewhere in the abdomen and pelvis.  No free air.  There is mild presacral edema.    Retroperitoneum and lymph nodes: There are new significantly enlarged lymph nodes in the upper abdomen.  Enlarged right perigastric lymph node measures up to 1.4 cm in short axis (series 3, image 33).  Additional enlarged right perigastric lymph node measures 1.3 cm in short axis (series 3, image 23).  Enlarged mesenteric lymph node in the upper abdomen measures 1.6 cm in short axis (series 3, image 67).  Few scattered subcentimeter retroperitoneal lymph nodes.  Enlarged right iliac chain lymph node measures 1.4 cm in short axis (series 3, image 135).  There are few mildly enlarged pelvic lymph nodes measuring up to 1.1 cm in short axis (series 3, image 141).    Thoracic Soft Tissue/Abdominal wall: Diffuse body wall edema.    Vasculature: Left sided aortic arch. The aorta is normal in caliber with atherosclerotic calcification along the course of the aorta including the origins of the celiac and superior mesenteric arteries.  Portal vein is enlarged.  Gastroesophageal varices noted.  Enlarged perisplenic collaterals with splenorenal shunt.    Bones: There is ossification of the anterior and posterior longitudinal ligaments, syndesmophyte formation, and ankylosis of multiple facet joints predominately involving the thoracolumbar spine, in addition to narrowing of the sacroiliac joints.  Well-defined lytic lesion within the proximal left femur with no aggressive features, stable from prior imaging and likely a benign lesion such as a unicameral bone cyst or intraosseous lipoma.      Impression    1. Significantly enlarged lobular ill-defined prostate, increased in size  when compared to 01/28/2021 and possibly related to progression of disease in this patient with reported history of prostate cancer.  2. Multiple ill-defined hypoattenuating hepatic masses which are best evaluated on the portal venous phase.  Absence of arterial enhancement is more suggestive of metastatic disease than multifocal hepatocellular carcinoma.  Consider further evaluation with multiphase abdominal MRI and/or tissue sampling as clinically warranted.  3. Multiple enlarged lymph nodes in the chest, abdomen, and pelvis suggestive of vale metastatic disease as discussed in the body of the report.  4. Multiple small peritoneal nodules which may suggest peritoneal carcinomatosis.  New left adrenal mass measuring 2.5 cm in size, possibly metastatic disease.  5. Cirrhotic liver morphology with stigmata portal hypertension including moderate volume abdominopelvic ascites, marked splenomegaly, and gastroesophageal varices.  6. Ossification of the anterior longitudinal ligaments, syndesmophyte formation, ankylosis of multiple facet joints and narrowing of the sacroiliac joints.  This constellation of findings can be seen in the setting of seronegative spondyloarthropathies such as ankylosing spondylitis.  No acute fracture.  7. Anasarca  8. Cholelithiasis.  9. Small right pleural effusion.  10.  Additional incidental findings discussed in the body of the report.    Electronically signed by resident: Daniel Monterroso  Date:    03/30/2022  Time:    22:16    Electronically signed by: Adebayo Harris MD  Date:    03/31/2022  Time:    00:22       Pending Diagnostic Studies:     Procedure Component Value Units Date/Time    Cytology, Fluid/Wash/Brush [[id number]] Collected: 04/01/22 1222    Order Status: Sent Lab Status: In process Updated: 04/02/22 0249         Medications:  Reconciled Home Medications:      Medication List      CHANGE how you take these medications    HYDROmorphone 2 MG tablet  Commonly known as:  DILAUDID  Take 1 tablet (2 mg total) by mouth every 4 (four) hours as needed for Pain.  What changed:   · how much to take  · when to take this  · reasons to take this        STOP taking these medications    betamethasone valerate 0.1% 0.1 % Crea  Commonly known as: VALISONE     BIOFREEZE (MENTHOL) TOP     carvediloL 3.125 MG tablet  Commonly known as: COREG     diphenoxylate-atropine 2.5-0.025 mg 2.5-0.025 mg per tablet  Commonly known as: LOMOTIL     furosemide 40 MG tablet  Commonly known as: LASIX     spironolactone 25 MG tablet  Commonly known as: ALDACTONE     TRULICITY 0.75 mg/0.5 mL pen injector  Generic drug: dulaglutide            Indwelling Lines/Drains at time of discharge:   Lines/Drains/Airways     None                 Time spent on the discharge of patient: 30 minutes         Chris William MD  Department of Hospital Medicine  [facility] - Telemetry Stepdown

## 2022-04-02 NOTE — PLAN OF CARE
Received patient at change of shift. Pt aaox4. Forgetful at times. Wife at bedside. BP low. Pt c/o 8/10 back pain. Call bell in reach rn will continue to monitor.     Problem: Infection  Goal: Absence of Infection Signs and Symptoms  Outcome: Ongoing, Progressing     Problem: Adult Inpatient Plan of Care  Goal: Plan of Care Review  Outcome: Ongoing, Progressing  Goal: Patient-Specific Goal (Individualized)  Outcome: Ongoing, Progressing  Goal: Absence of Hospital-Acquired Illness or Injury  Outcome: Ongoing, Progressing  Goal: Optimal Comfort and Wellbeing  Outcome: Ongoing, Progressing  Goal: Readiness for Transition of Care  Outcome: Ongoing, Progressing     Problem: Diabetes Comorbidity  Goal: Blood Glucose Level Within Targeted Range  Outcome: Ongoing, Progressing     Problem: Fall Injury Risk  Goal: Absence of Fall and Fall-Related Injury  Outcome: Ongoing, Progressing     Problem: Skin Injury Risk Increased  Goal: Skin Health and Integrity  Outcome: Ongoing, Progressing

## 2022-04-02 NOTE — PLAN OF CARE
[facility] - Telemetry Stepdown      HOME HEALTH ORDERS  FACE TO FACE ENCOUNTER    Patient Name: Andrei Longo  YOB: 1945    PCP: Damian Yee MD   PCP Address: [address] [place] [address] / [place] [address]  PCP Phone Number: [phone]  PCP Fax: [phone]    Encounter Date: 3/30/22    Admit to Home Health with transition to Hospice    Diagnoses:  Active Hospital Problems    Diagnosis  POA    *Anasarca [R60.1]  Yes    Malignant neoplasm metastatic to intra-abdominal lymph node [C77.2]  Yes    Decompensated hepatic cirrhosis [K72.90, K74.60]  Yes    Acute hypoxemic respiratory failure [J96.01]  Yes    History of testicular cancer [Z85.47]  Not Applicable    Diabetes mellitus [E11.9]  Yes      Resolved Hospital Problems    Diagnosis Date Resolved POA    Diffuse large cell lymphoma in remission [C83.30] 03/31/2022 Yes       Follow Up Appointments:  No future appointments.    Allergies:Review of patient's allergies indicates:  No Known Allergies    Medications: Review discharge medications with patient and family and provide education.    Current Facility-Administered Medications   Medication Dose Route Frequency Provider Last Rate Last Admin    acetaminophen tablet 500 mg  500 mg Oral Q4H PRN Damian Lane MD        albuterol-ipratropium 2.5 mg-0.5 mg/3 mL nebulizer solution 3 mL  3 mL Nebulization Q6H PRN Damian Lane MD        aluminum-magnesium hydroxide-simethicone 200-200-20 mg/5 mL suspension 30 mL  30 mL Oral QID PRN Damian Lane MD        glucagon (human recombinant) injection 1 mg  1 mg Intramuscular PRN Damian Lane MD        glucose chewable tablet 16 g  16 g Oral PRN Damian Lane MD        glucose chewable tablet 24 g  24 g Oral PRN Damian Lane MD        HYDROmorphone tablet 2 mg  2 mg Oral Q4H PRN Chris Wililam MD   2 mg at 04/02/22 0609    insulin aspart U-100 pen 0-5 Units  0-5 Units Subcutaneous QID (AC + HS) PRN Damian Lane  MD        melatonin tablet 6 mg  6 mg Oral Nightly PRN Damian Lane MD   6 mg at 04/01/22 2118    naloxone 0.4 mg/mL injection 0.02 mg  0.02 mg Intravenous PRN Damian Lane MD        ondansetron injection 4 mg  4 mg Intravenous Q8H PRN Damian Lane MD        senna tablet 8.6 mg  8.6 mg Oral Daily Chris William MD   8.6 mg at 04/02/22 0817    sodium chloride 0.9% flush 10 mL  10 mL Intravenous Q12H PRN Damian Lane MD         Facility-Administered Medications Ordered in Other Encounters   Medication Dose Route Frequency Provider Last Rate Last Admin    0.9%  NaCl infusion   Intravenous Continuous Pollo Wilkerson MD 20 mL/hr at 12/14/20 0645 20 mL/hr at 12/14/20 0645    0.9%  NaCl infusion   Intravenous Continuous Pollo Wilkerson MD   Stopped at 02/22/22 0850     Current Discharge Medication List      CONTINUE these medications which have CHANGED    Details   HYDROmorphone (DILAUDID) 2 MG tablet Take 1 tablet (2 mg total) by mouth every 4 (four) hours as needed for Pain.  Qty: 30 tablet, Refills: 0    Comments: Quantity prescribed more than 7 day supply? Yes, quantity medically necessary         STOP taking these medications       betamethasone valerate 0.1% (VALISONE) 0.1 % Crea Comments:   Reason for Stopping:         BIOFREEZE, MENTHOL, TOP Comments:   Reason for Stopping:         carvediloL (COREG) 3.125 MG tablet Comments:   Reason for Stopping:         diphenoxylate-atropine 2.5-0.025 mg (LOMOTIL) 2.5-0.025 mg per tablet Comments:   Reason for Stopping:         furosemide (LASIX) 40 MG tablet Comments:   Reason for Stopping:         spironolactone (ALDACTONE) 25 MG tablet Comments:   Reason for Stopping:         TRULICITY 0.75 mg/0.5 mL pen injector Comments:   Reason for Stopping:                 I have seen and examined this patient within the last 30 days. My clinical findings that support the need for the home health skilled services and home bound status are the  following:no   Weakness/numbness causing balance and gait disturbance due to Malignancy/Cancer making it taxing to leave home.     Diet:   2 gram sodium diet    Labs:  Report Lab results to PCP.    Referrals/ Consults  Physical Therapy to evaluate and treat. Evaluate for home safety and equipment needs; Establish/upgrade home exercise program. Perform / instruct on therapeutic exercises, gait training, transfer training, and Range of Motion.  Occupational Therapy to evaluate and treat. Evaluate home environment for safety and equipment needs. Perform/Instruct on transfers, ADL training, ROM, and therapeutic exercises.   to evaluate for community resources/long-range planning.  Aide to provide assistance with personal care, ADLs, and vital signs.    Activities:   activity as tolerated    Nursing:   Agency to admit patient within 24 hours of hospital discharge unless specified on physician order or at patient request    SN to complete comprehensive assessment including routine vital signs. Instruct on disease process and s/s of complications to report to MD. Review/verify medication list sent home with the patient at time of discharge  and instruct patient/caregiver as needed. Frequency may be adjusted depending on start of care date.     Skilled nurse to perform up to 3 visits PRN for symptoms related to diagnosis    Notify MD if SBP > 160 or < 90; DBP > 90 or < 50; HR > 120 or < 50; Temp > 101; O2 < 88%; Other:      Ok to schedule additional visits based on staff availability and patient request on consecutive days within the home health episode.    When multiple disciplines ordered:    Start of Care occurs on Sunday - Wednesday schedule remaining discipline evaluations as ordered on separate consecutive days following the start of care.    Thursday SOC -schedule subsequent evaluations Friday and Monday the following week.     Friday - Saturday SOC - schedule subsequent discipline evaluations on  consecutive days starting Monday of the following week.    For all post-discharge communication and subsequent orders please contact patient's primary care physician. If unable to reach primary care physician or do not receive response within 30 minutes, please contact Dr. Chris William for clinical staff order clarification    Miscellaneous   Home Oxygen:  Oxygen at 2 L/min nasal canula to be used:  As needed for SOB.    Home Health Aide:  Nursing Three times weekly and Home Health Aide Three times weekly    Wound Care Orders  no    I certify that this patient is confined to his home and needs intermittent skilled nursing care.

## 2022-04-04 NOTE — PT/OT/SLP DISCHARGE
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  Please refer to prior OT note dated 03/31/22 for functional status.    Assessment:      Patient appropriate for care in another setting.    Objective:     GOALS:   Multidisciplinary Problems     Occupational Therapy Goals        Problem: Occupational Therapy    Goal Priority Disciplines Outcome Interventions   Occupational Therapy Goal     OT, PT/OT Ongoing, Progressing    Description: Goals to be met by: 4/14/22     Patient will increase functional independence with ADLs by performing:    Grooming while standing at sink with Supervision.  Toileting from toilet with Supervision for hygiene and clothing management.   Supine to sit with Supervision.  Step transfer with Supervision and RW                     Reasons for Discontinuation of Therapy Services  Transfer to alternate level of care.      Plan:     Patient Discharged to: Home with Home Health Service    4/4/2022

## 2022-04-04 NOTE — PLAN OF CARE
Received call from patient's daughter, Diane, stating the hospice company never contacted them and was not arranged. Diane requesting [facility] (Ph: [phone] / Fax: [phone])    Spoke with admissions and faxed referral to [facility].    Tori Soares, SOFÍA  [facility]  Ext. [phone]

## 2022-04-08 LAB — BACTERIA SPEC ANAEROBE CULT: NORMAL

## 2022-04-11 LAB
FINAL PATHOLOGIC DIAGNOSIS: NORMAL
Lab: NORMAL
MICROSCOPIC EXAM: NORMAL